# Patient Record
Sex: MALE | Race: BLACK OR AFRICAN AMERICAN | NOT HISPANIC OR LATINO | Employment: OTHER | URBAN - METROPOLITAN AREA
[De-identification: names, ages, dates, MRNs, and addresses within clinical notes are randomized per-mention and may not be internally consistent; named-entity substitution may affect disease eponyms.]

---

## 2019-06-02 ENCOUNTER — HOSPITAL ENCOUNTER (INPATIENT)
Facility: HOSPITAL | Age: 84
LOS: 4 days | Discharge: HOME HEALTH | DRG: 871 | End: 2019-06-06
Attending: FAMILY MEDICINE | Admitting: HOSPITALIST
Payer: MEDICARE

## 2019-06-02 DIAGNOSIS — R78.81 BACTEREMIA: ICD-10-CM

## 2019-06-02 DIAGNOSIS — A41.9 SEPSIS: Primary | ICD-10-CM

## 2019-06-02 DIAGNOSIS — A49.1 STREPTOCOCCUS AGALACTIAE INFECTION: ICD-10-CM

## 2019-06-02 DIAGNOSIS — J18.9 PNEUMONIA OF RIGHT MIDDLE LOBE DUE TO INFECTIOUS ORGANISM: ICD-10-CM

## 2019-06-02 PROBLEM — N17.9 ACUTE KIDNEY INJURY SUPERIMPOSED ON CHRONIC KIDNEY DISEASE: Status: ACTIVE | Noted: 2019-06-02

## 2019-06-02 PROBLEM — N18.9 ACUTE KIDNEY INJURY SUPERIMPOSED ON CHRONIC KIDNEY DISEASE: Status: ACTIVE | Noted: 2019-06-02

## 2019-06-02 LAB
ALBUMIN SERPL BCP-MCNC: 3.7 G/DL (ref 3.5–5.2)
ALP SERPL-CCNC: 73 U/L (ref 55–135)
ALT SERPL W/O P-5'-P-CCNC: 25 U/L (ref 10–44)
ANION GAP SERPL CALC-SCNC: 11 MMOL/L (ref 8–16)
APTT BLDCRRT: 26.1 SEC (ref 21–32)
AST SERPL-CCNC: 18 U/L (ref 10–40)
BASOPHILS # BLD AUTO: 0 K/UL (ref 0–0.2)
BASOPHILS NFR BLD: 0 % (ref 0–1.9)
BILIRUB SERPL-MCNC: 0.6 MG/DL (ref 0.1–1)
BILIRUB UR QL STRIP: NEGATIVE
BNP SERPL-MCNC: 73 PG/ML (ref 0–99)
BUN SERPL-MCNC: 30 MG/DL (ref 8–23)
CALCIUM SERPL-MCNC: 9.7 MG/DL (ref 8.7–10.5)
CHLORIDE SERPL-SCNC: 101 MMOL/L (ref 95–110)
CLARITY UR: CLEAR
CO2 SERPL-SCNC: 26 MMOL/L (ref 23–29)
COLOR UR: YELLOW
CREAT SERPL-MCNC: 1.9 MG/DL (ref 0.5–1.4)
DIFFERENTIAL METHOD: ABNORMAL
EOSINOPHIL # BLD AUTO: 0.1 K/UL (ref 0–0.5)
EOSINOPHIL NFR BLD: 0.4 % (ref 0–8)
ERYTHROCYTE [DISTWIDTH] IN BLOOD BY AUTOMATED COUNT: 15.1 % (ref 11.5–14.5)
EST. GFR  (AFRICAN AMERICAN): 36 ML/MIN/1.73 M^2
EST. GFR  (NON AFRICAN AMERICAN): 31 ML/MIN/1.73 M^2
GLUCOSE SERPL-MCNC: 140 MG/DL (ref 70–110)
GLUCOSE UR QL STRIP: NEGATIVE
HCT VFR BLD AUTO: 39.2 % (ref 40–54)
HGB BLD-MCNC: 12.8 G/DL (ref 14–18)
HGB UR QL STRIP: ABNORMAL
INFLUENZA A, MOLECULAR: NEGATIVE
INFLUENZA B, MOLECULAR: NEGATIVE
INR PPP: 1 (ref 0.8–1.2)
KETONES UR QL STRIP: NEGATIVE
LACTATE SERPL-SCNC: 2.1 MMOL/L (ref 0.5–2.2)
LEUKOCYTE ESTERASE UR QL STRIP: NEGATIVE
LIPASE SERPL-CCNC: 11 U/L (ref 4–60)
LYMPHOCYTES # BLD AUTO: 0.2 K/UL (ref 1–4.8)
LYMPHOCYTES NFR BLD: 1.4 % (ref 18–48)
MAGNESIUM SERPL-MCNC: 1.8 MG/DL (ref 1.6–2.6)
MCH RBC QN AUTO: 30.8 PG (ref 27–31)
MCHC RBC AUTO-ENTMCNC: 32.7 G/DL (ref 32–36)
MCV RBC AUTO: 95 FL (ref 82–98)
MONOCYTES # BLD AUTO: 0.6 K/UL (ref 0.3–1)
MONOCYTES NFR BLD: 4.7 % (ref 4–15)
NEUTROPHILS # BLD AUTO: 12.9 K/UL (ref 1.8–7.7)
NEUTROPHILS NFR BLD: 93.5 % (ref 38–73)
NITRITE UR QL STRIP: NEGATIVE
PH UR STRIP: 6 [PH] (ref 5–8)
PHOSPHATE SERPL-MCNC: 2.4 MG/DL (ref 2.7–4.5)
PLATELET # BLD AUTO: 128 K/UL (ref 150–350)
PMV BLD AUTO: 12.2 FL (ref 9.2–12.9)
POTASSIUM SERPL-SCNC: 4.6 MMOL/L (ref 3.5–5.1)
PROCALCITONIN SERPL IA-MCNC: 0.22 NG/ML
PROT SERPL-MCNC: 7 G/DL (ref 6–8.4)
PROT UR QL STRIP: NEGATIVE
PROTHROMBIN TIME: 10.4 SEC (ref 9–12.5)
RBC # BLD AUTO: 4.15 M/UL (ref 4.6–6.2)
SODIUM SERPL-SCNC: 138 MMOL/L (ref 136–145)
SP GR UR STRIP: 1.01 (ref 1–1.03)
SPECIMEN SOURCE: NORMAL
TROPONIN I SERPL DL<=0.01 NG/ML-MCNC: 0.02 NG/ML (ref 0–0.03)
TSH SERPL DL<=0.005 MIU/L-ACNC: 0.65 UIU/ML (ref 0.4–4)
URN SPEC COLLECT METH UR: ABNORMAL
UROBILINOGEN UR STRIP-ACNC: NEGATIVE EU/DL
WBC # BLD AUTO: 13.74 K/UL (ref 3.9–12.7)

## 2019-06-02 PROCEDURE — 87040 BLOOD CULTURE FOR BACTERIA: CPT | Mod: 59

## 2019-06-02 PROCEDURE — 83690 ASSAY OF LIPASE: CPT

## 2019-06-02 PROCEDURE — 87147 CULTURE TYPE IMMUNOLOGIC: CPT | Mod: 59

## 2019-06-02 PROCEDURE — 96361 HYDRATE IV INFUSION ADD-ON: CPT | Mod: 59

## 2019-06-02 PROCEDURE — 96375 TX/PRO/DX INJ NEW DRUG ADDON: CPT | Performed by: FAMILY MEDICINE

## 2019-06-02 PROCEDURE — 87449 NOS EACH ORGANISM AG IA: CPT

## 2019-06-02 PROCEDURE — 83605 ASSAY OF LACTIC ACID: CPT

## 2019-06-02 PROCEDURE — 96361 HYDRATE IV INFUSION ADD-ON: CPT | Performed by: FAMILY MEDICINE

## 2019-06-02 PROCEDURE — 11000001 HC ACUTE MED/SURG PRIVATE ROOM

## 2019-06-02 PROCEDURE — 87186 SC STD MICRODIL/AGAR DIL: CPT

## 2019-06-02 PROCEDURE — 96374 THER/PROPH/DIAG INJ IV PUSH: CPT

## 2019-06-02 PROCEDURE — 93010 EKG 12-LEAD: ICD-10-PCS | Mod: ,,, | Performed by: INTERNAL MEDICINE

## 2019-06-02 PROCEDURE — 63600175 PHARM REV CODE 636 W HCPCS: Performed by: HOSPITALIST

## 2019-06-02 PROCEDURE — 96374 THER/PROPH/DIAG INJ IV PUSH: CPT | Mod: 59 | Performed by: FAMILY MEDICINE

## 2019-06-02 PROCEDURE — 99291 CRITICAL CARE FIRST HOUR: CPT | Mod: 25

## 2019-06-02 PROCEDURE — 25000242 PHARM REV CODE 250 ALT 637 W/ HCPCS: Performed by: FAMILY MEDICINE

## 2019-06-02 PROCEDURE — 83880 ASSAY OF NATRIURETIC PEPTIDE: CPT

## 2019-06-02 PROCEDURE — 94640 AIRWAY INHALATION TREATMENT: CPT

## 2019-06-02 PROCEDURE — 81003 URINALYSIS AUTO W/O SCOPE: CPT

## 2019-06-02 PROCEDURE — 85025 COMPLETE CBC W/AUTO DIFF WBC: CPT

## 2019-06-02 PROCEDURE — 87502 INFLUENZA DNA AMP PROBE: CPT

## 2019-06-02 PROCEDURE — 84100 ASSAY OF PHOSPHORUS: CPT

## 2019-06-02 PROCEDURE — 63600175 PHARM REV CODE 636 W HCPCS: Performed by: FAMILY MEDICINE

## 2019-06-02 PROCEDURE — 85610 PROTHROMBIN TIME: CPT

## 2019-06-02 PROCEDURE — 85730 THROMBOPLASTIN TIME PARTIAL: CPT

## 2019-06-02 PROCEDURE — 83735 ASSAY OF MAGNESIUM: CPT

## 2019-06-02 PROCEDURE — G0378 HOSPITAL OBSERVATION PER HR: HCPCS

## 2019-06-02 PROCEDURE — 25000003 PHARM REV CODE 250: Performed by: HOSPITALIST

## 2019-06-02 PROCEDURE — S0028 INJECTION, FAMOTIDINE, 20 MG: HCPCS | Performed by: HOSPITALIST

## 2019-06-02 PROCEDURE — 93010 ELECTROCARDIOGRAM REPORT: CPT | Mod: ,,, | Performed by: INTERNAL MEDICINE

## 2019-06-02 PROCEDURE — 84443 ASSAY THYROID STIM HORMONE: CPT

## 2019-06-02 PROCEDURE — 84145 PROCALCITONIN (PCT): CPT

## 2019-06-02 PROCEDURE — 25000003 PHARM REV CODE 250: Performed by: FAMILY MEDICINE

## 2019-06-02 PROCEDURE — 93005 ELECTROCARDIOGRAM TRACING: CPT

## 2019-06-02 PROCEDURE — 84484 ASSAY OF TROPONIN QUANT: CPT

## 2019-06-02 PROCEDURE — 80053 COMPREHEN METABOLIC PANEL: CPT

## 2019-06-02 RX ORDER — FINASTERIDE 5 MG/1
5 TABLET, FILM COATED ORAL DAILY
Status: DISCONTINUED | OUTPATIENT
Start: 2019-06-03 | End: 2019-06-06 | Stop reason: HOSPADM

## 2019-06-02 RX ORDER — ARFORMOTEROL TARTRATE 15 UG/2ML
15 SOLUTION RESPIRATORY (INHALATION) EVERY 12 HOURS
Status: DISCONTINUED | OUTPATIENT
Start: 2019-06-03 | End: 2019-06-06 | Stop reason: HOSPADM

## 2019-06-02 RX ORDER — ASPIRIN 81 MG/1
81 TABLET ORAL DAILY
Status: DISCONTINUED | OUTPATIENT
Start: 2019-06-03 | End: 2019-06-06 | Stop reason: HOSPADM

## 2019-06-02 RX ORDER — METOPROLOL SUCCINATE 25 MG/1
25 TABLET, EXTENDED RELEASE ORAL DAILY
COMMUNITY

## 2019-06-02 RX ORDER — ACETAMINOPHEN 325 MG/1
650 TABLET ORAL
Status: COMPLETED | OUTPATIENT
Start: 2019-06-02 | End: 2019-06-02

## 2019-06-02 RX ORDER — IPRATROPIUM BROMIDE AND ALBUTEROL SULFATE 2.5; .5 MG/3ML; MG/3ML
3 SOLUTION RESPIRATORY (INHALATION) EVERY 6 HOURS PRN
Status: DISCONTINUED | OUTPATIENT
Start: 2019-06-02 | End: 2019-06-06 | Stop reason: HOSPADM

## 2019-06-02 RX ORDER — METOPROLOL SUCCINATE 25 MG/1
25 TABLET, EXTENDED RELEASE ORAL DAILY
Status: DISCONTINUED | OUTPATIENT
Start: 2019-06-03 | End: 2019-06-06 | Stop reason: HOSPADM

## 2019-06-02 RX ORDER — FINASTERIDE 5 MG/1
5 TABLET, FILM COATED ORAL DAILY
COMMUNITY

## 2019-06-02 RX ORDER — SODIUM CHLORIDE 0.9 % (FLUSH) 0.9 %
10 SYRINGE (ML) INJECTION
Status: DISCONTINUED | OUTPATIENT
Start: 2019-06-02 | End: 2019-06-06 | Stop reason: HOSPADM

## 2019-06-02 RX ORDER — IPRATROPIUM BROMIDE AND ALBUTEROL SULFATE 2.5; .5 MG/3ML; MG/3ML
3 SOLUTION RESPIRATORY (INHALATION)
Status: COMPLETED | OUTPATIENT
Start: 2019-06-02 | End: 2019-06-02

## 2019-06-02 RX ORDER — BUDESONIDE 0.5 MG/2ML
0.5 INHALANT ORAL EVERY 12 HOURS
Status: DISCONTINUED | OUTPATIENT
Start: 2019-06-03 | End: 2019-06-06 | Stop reason: HOSPADM

## 2019-06-02 RX ORDER — ACETAMINOPHEN 325 MG/1
325 TABLET ORAL EVERY 4 HOURS PRN
Status: DISCONTINUED | OUTPATIENT
Start: 2019-06-02 | End: 2019-06-06 | Stop reason: HOSPADM

## 2019-06-02 RX ORDER — ALBUTEROL SULFATE 90 UG/1
2 AEROSOL, METERED RESPIRATORY (INHALATION) EVERY 6 HOURS PRN
COMMUNITY

## 2019-06-02 RX ORDER — LOSARTAN POTASSIUM 50 MG/1
50 TABLET ORAL DAILY
COMMUNITY

## 2019-06-02 RX ORDER — ONDANSETRON 2 MG/ML
4 INJECTION INTRAMUSCULAR; INTRAVENOUS EVERY 8 HOURS PRN
Status: DISCONTINUED | OUTPATIENT
Start: 2019-06-02 | End: 2019-06-06 | Stop reason: HOSPADM

## 2019-06-02 RX ORDER — BENZONATATE 100 MG/1
100 CAPSULE ORAL 3 TIMES DAILY PRN
Status: DISCONTINUED | OUTPATIENT
Start: 2019-06-02 | End: 2019-06-06 | Stop reason: HOSPADM

## 2019-06-02 RX ORDER — SODIUM CHLORIDE 450 MG/100ML
INJECTION, SOLUTION INTRAVENOUS CONTINUOUS
Status: ACTIVE | OUTPATIENT
Start: 2019-06-02 | End: 2019-06-03

## 2019-06-02 RX ORDER — ATORVASTATIN CALCIUM 40 MG/1
40 TABLET, FILM COATED ORAL NIGHTLY
Status: DISCONTINUED | OUTPATIENT
Start: 2019-06-02 | End: 2019-06-02

## 2019-06-02 RX ORDER — FAMOTIDINE 20 MG/50ML
20 INJECTION, SOLUTION INTRAVENOUS DAILY
Status: DISCONTINUED | OUTPATIENT
Start: 2019-06-02 | End: 2019-06-06 | Stop reason: HOSPADM

## 2019-06-02 RX ORDER — ATORVASTATIN CALCIUM 10 MG/1
10 TABLET, FILM COATED ORAL DAILY
Status: DISCONTINUED | OUTPATIENT
Start: 2019-06-03 | End: 2019-06-06 | Stop reason: HOSPADM

## 2019-06-02 RX ORDER — CEFEPIME HYDROCHLORIDE 2 G/50ML
2 INJECTION, SOLUTION INTRAVENOUS
Status: DISCONTINUED | OUTPATIENT
Start: 2019-06-02 | End: 2019-06-02

## 2019-06-02 RX ORDER — METHYLPREDNISOLONE SOD SUCC 125 MG
125 VIAL (EA) INJECTION
Status: COMPLETED | OUTPATIENT
Start: 2019-06-02 | End: 2019-06-02

## 2019-06-02 RX ORDER — CARVEDILOL 3.12 MG/1
6.25 TABLET ORAL 2 TIMES DAILY WITH MEALS
Status: DISCONTINUED | OUTPATIENT
Start: 2019-06-02 | End: 2019-06-02

## 2019-06-02 RX ORDER — FUROSEMIDE 20 MG/1
20 TABLET ORAL DAILY
COMMUNITY

## 2019-06-02 RX ORDER — ATORVASTATIN CALCIUM 10 MG/1
10 TABLET, FILM COATED ORAL DAILY
COMMUNITY

## 2019-06-02 RX ADMIN — METHYLPREDNISOLONE SODIUM SUCCINATE 125 MG: 125 INJECTION, POWDER, FOR SOLUTION INTRAMUSCULAR; INTRAVENOUS at 06:06

## 2019-06-02 RX ADMIN — IPRATROPIUM BROMIDE AND ALBUTEROL SULFATE 3 ML: .5; 3 SOLUTION RESPIRATORY (INHALATION) at 06:06

## 2019-06-02 RX ADMIN — Medication 500 MG: at 08:06

## 2019-06-02 RX ADMIN — CEFTRIAXONE 1 G: 1 INJECTION, SOLUTION INTRAVENOUS at 11:06

## 2019-06-02 RX ADMIN — ACETAMINOPHEN 650 MG: 325 TABLET ORAL at 05:06

## 2019-06-02 RX ADMIN — FAMOTIDINE 20 MG: 20 INJECTION, SOLUTION INTRAVENOUS at 10:06

## 2019-06-02 RX ADMIN — SODIUM CHLORIDE 3063 ML: 0.9 INJECTION, SOLUTION INTRAVENOUS at 05:06

## 2019-06-02 RX ADMIN — PROMETHAZINE HYDROCHLORIDE 12.5 MG: 25 INJECTION INTRAMUSCULAR; INTRAVENOUS at 06:06

## 2019-06-02 RX ADMIN — SODIUM CHLORIDE: 0.45 INJECTION, SOLUTION INTRAVENOUS at 08:06

## 2019-06-02 NOTE — ED NOTES
Pt c/o SOB while resting and on exertion, fever, nausea, tiredness, urinary frequency - onset today. Denies chest pain, vomiting, dysuria.    Patient moved to ED room 1. Patient assisted onto stretcher and changed into a gown. Patient placed on cardiac monitor, continuous pulse oximetry and automatic blood pressure cuff. Bed placed in low locked position, side rails up x 2, call light is within reach of patient or family, orientation to room and explanation of wait provided to family and patient, alarms set and turned on for monitor and pulse ox, awaiting MD evaluation and orders, will continue to monitor.    Patient identifies self as Thai Rincon      LOC: The patient is awake, alert and aware of environment with an appropriate affect, the patient is oriented x 3 and speaking appropriately.  APPEARANCE: Patient resting comfortably and in no acute distress, patient is clean and well groomed, patient's clothing is properly fastened.  SKIN: The skin is warm and dry, color consistent with ethnicity, patient has normal skin turgor and moist mucus membranes, skin intact, no breakdown or bruising noted.  MUSCULOSKELETAL: Patient moving all extremities well, no obvious swelling. First and second digit of left hand are not present.   RESPIRATORY: Airway is open and patent, respirations are spontaneous, patient has a normal effort and rate, no accessory muscle use noted.  CARDIAC: Patient has a normal rate and rhythm, no periphreal edema noted, capillary refill < 3 seconds.  ABDOMEN: Soft and non tender to palpation, no distention noted.  NEUROLOGIC: PERRL, eyes open spontaneously, behavior appropriate to situation, follows commands, facial expression symmetrical, bilateral hand grasp equal and even, purposeful motor response noted, normal sensation in all extremities when touched with a finger.

## 2019-06-02 NOTE — ED NOTES
The patient is resting quietly, eyes closed, arouses easily to stimuli. Airway is open and patent, respirations are spontaneous, normal respiratory effort and rate noted, skin warm and dry, appearance: in no acute distress and resting comfortably. Wife at bedside.

## 2019-06-02 NOTE — ED PROVIDER NOTES
SCRIBE #1 NOTE: I, Judithjackie Slater, am scribing for, and in the presence of, Liz Gee MD. I have scribed the entire note.         History     Chief Complaint   Patient presents with    Fever     pt c/o fever and malaise       Review of patient's allergies indicates:  No Known Allergies      History of Present Illness   HPI    6/2/2019, 4:39 PM  History obtained from the daughter and patient      History of Present Illness: Thai Rincon is a 88 y.o. male patient with PMHx of BPH and HTN who presents to the Emergency Department for SOB which onset gradually at 1:30pm. Symptoms are constant and moderate in severity. No mitigating or exacerbating factors reported. Associated sxs include fatigue. Daughter notes patient has been having nausea, fever, chills, and urinary frequency since the drive back from [place]. Patient reports having a normal BM today. Patient/daughter denies any cough, CP, leg swelling, abd pain, dysuria, vomiting, diarrhea, sick contacts, and all other sxs at this time. Patient is not on supplement O2 at home. No further complaints or concerns at this time.     Arrival mode: Personal vehicle     PCP: Enoc Frost MD        Past Medical History:  Past Medical History:   Diagnosis Date    BPH (benign prostatic hyperplasia)     Glaucoma     Hypertension     Kidney stones        Past Surgical History:  Past Surgical History:   Procedure Laterality Date    HERNIA REPAIR Right 2010         Family History:  History reviewed. No pertinent family history.    Social History:  Social History     Tobacco Use    Smoking status: Never Smoker    Smokeless tobacco: Never Used   Substance and Sexual Activity    Alcohol use: No     Alcohol/week: 0.0 oz    Drug use: No    Sexual activity: Not Currently        Review of Systems   Review of Systems   Constitutional: Positive for chills, fatigue and fever.   HENT: Negative for sore throat.    Respiratory: Positive for shortness of breath. Negative for cough.     Cardiovascular: Negative for chest pain and leg swelling.   Gastrointestinal: Positive for nausea. Negative for abdominal pain, diarrhea and vomiting.   Genitourinary: Positive for frequency. Negative for dysuria.   Musculoskeletal: Negative for back pain.   Skin: Negative for rash.   Neurological: Negative for weakness.   Hematological: Does not bruise/bleed easily.   All other systems reviewed and are negative.       Physical Exam     Initial Vitals [06/02/19 1620]   BP Pulse Resp Temp SpO2   139/81 94 (!) 24 (!) 100.9 °F (38.3 °C) 95 %      MAP       --          Physical Exam  Nursing Notes and Vital Signs Reviewed.  Constitutional: Patient is in no acute distress. Well-developed and well-nourished. Patient appears fatigued.  Head: Atraumatic. Normocephalic.  Eyes: PERRL. EOM intact. Conjunctivae are not pale. No scleral icterus.  ENT: Mucous membranes are moist. Oropharynx is clear and symmetric.    Neck: Supple. Full ROM. No lymphadenopathy.  Cardiovascular: Regular rate. Regular rhythm. No murmurs, rubs, or gallops. Distal pulses are 2+ and symmetric.  Pulmonary/Chest: Slightly tachypneic. Conversational dyspnea. R upper mid-lobe decreased breath sounds. Clear to auscultation bilaterally. No wheezing or rales.  Abdominal: Soft and non-distended.  There is no tenderness.  No rebound, guarding, or rigidity. Good bowel sounds.  Musculoskeletal: Moves all extremities. No obvious deformities. No edema.   Skin: Warm and dry.  Neurological:  Alert, awake, and appropriate.  Normal speech.  No acute focal neurological deficits are appreciated.  Psychiatric: Normal affect. Good eye contact. Appropriate in content.     ED Course   Critical Care  Date/Time: 6/2/2019 6:22 PM  Performed by: Liz Gee MD  Authorized by: Liz Gee MD   Direct patient critical care time: 10 minutes  Additional history critical care time: 9 minutes  Ordering / reviewing critical care time: 9 minutes  Documentation critical care  "time: 8 minutes  Consulting other physicians critical care time: 9 minutes  Total critical care time (exclusive of procedural time) : 45 minutes  Critical care time was exclusive of separately billable procedures and treating other patients and teaching time.  Critical care was necessary to treat or prevent imminent or life-threatening deterioration of the following conditions: sepsis.  Critical care was time spent personally by me on the following activities: blood draw for specimens, development of treatment plan with patient or surrogate, discussions with consultants, interpretation of cardiac output measurements, evaluation of patient's response to treatment, examination of patient, obtaining history from patient or surrogate, ordering and performing treatments and interventions, ordering and review of laboratory studies, ordering and review of radiographic studies, re-evaluation of patient's condition, pulse oximetry and review of old charts.        ED Vital Signs:  Vitals:    06/02/19 1620 06/02/19 1713 06/02/19 1830 06/02/19 1838   BP: 139/81  (!) 149/70    Pulse: 94 85 90 88   Resp: (!) 24  20 (!) 24   Temp: (!) 100.9 °F (38.3 °C)      TempSrc: Oral      SpO2: 95%  97% 96%   Weight: 102.1 kg (225 lb)      Height: 5' 9" (1.753 m)       06/02/19 1841 06/02/19 2049 06/02/19 2338   BP:  (!) 116/59 126/67   Pulse:  86 71   Resp:  16 20   Temp: (!) 100.4 °F (38 °C) 98.6 °F (37 °C) 97.4 °F (36.3 °C)   TempSrc: Oral Oral Oral   SpO2:  95% 95%   Weight:  93.6 kg (206 lb 5.6 oz)    Height:  5' 9" (1.753 m)        Abnormal Lab Results:  Labs Reviewed   CBC W/ AUTO DIFFERENTIAL - Abnormal; Notable for the following components:       Result Value    WBC 13.74 (*)     RBC 4.15 (*)     Hemoglobin 12.8 (*)     Hematocrit 39.2 (*)     RDW 15.1 (*)     Platelets 128 (*)     Gran # (ANC) 12.9 (*)     Lymph # 0.2 (*)     Gran% 93.5 (*)     Lymph% 1.4 (*)     All other components within normal limits   COMPREHENSIVE METABOLIC " PANEL - Abnormal; Notable for the following components:    Glucose 140 (*)     BUN, Bld 30 (*)     Creatinine 1.9 (*)     eGFR if  36 (*)     eGFR if non  31 (*)     All other components within normal limits   URINALYSIS, REFLEX TO URINE CULTURE - Abnormal; Notable for the following components:    Occult Blood UA Trace (*)     All other components within normal limits    Narrative:     Preferred Collection Type->Urine, Clean Catch   PHOSPHORUS - Abnormal; Notable for the following components:    Phosphorus 2.4 (*)     All other components within normal limits   INFLUENZA A & B BY MOLECULAR   CULTURE, BLOOD   CULTURE, BLOOD   LACTIC ACID, PLASMA   MAGNESIUM   APTT   PROTIME-INR   B-TYPE NATRIURETIC PEPTIDE   LIPASE   PROCALCITONIN   TROPONIN I   TSH        All Lab Results:  Results for orders placed or performed during the hospital encounter of 06/02/19   Influenza A & B by Molecular   Result Value Ref Range    Influenza A, Molecular Negative Negative    Influenza B, Molecular Negative Negative    Flu A & B Source Nasal swab    CBC auto differential   Result Value Ref Range    WBC 13.74 (H) 3.90 - 12.70 K/uL    RBC 4.15 (L) 4.60 - 6.20 M/uL    Hemoglobin 12.8 (L) 14.0 - 18.0 g/dL    Hematocrit 39.2 (L) 40.0 - 54.0 %    Mean Corpuscular Volume 95 82 - 98 fL    Mean Corpuscular Hemoglobin 30.8 27.0 - 31.0 pg    Mean Corpuscular Hemoglobin Conc 32.7 32.0 - 36.0 g/dL    RDW 15.1 (H) 11.5 - 14.5 %    Platelets 128 (L) 150 - 350 K/uL    MPV 12.2 9.2 - 12.9 fL    Gran # (ANC) 12.9 (H) 1.8 - 7.7 K/uL    Lymph # 0.2 (L) 1.0 - 4.8 K/uL    Mono # 0.6 0.3 - 1.0 K/uL    Eos # 0.1 0.0 - 0.5 K/uL    Baso # 0.00 0.00 - 0.20 K/uL    Gran% 93.5 (H) 38.0 - 73.0 %    Lymph% 1.4 (L) 18.0 - 48.0 %    Mono% 4.7 4.0 - 15.0 %    Eosinophil% 0.4 0.0 - 8.0 %    Basophil% 0.0 0.0 - 1.9 %    Differential Method Automated    Comprehensive metabolic panel   Result Value Ref Range    Sodium 138 136 - 145 mmol/L     Potassium 4.6 3.5 - 5.1 mmol/L    Chloride 101 95 - 110 mmol/L    CO2 26 23 - 29 mmol/L    Glucose 140 (H) 70 - 110 mg/dL    BUN, Bld 30 (H) 8 - 23 mg/dL    Creatinine 1.9 (H) 0.5 - 1.4 mg/dL    Calcium 9.7 8.7 - 10.5 mg/dL    Total Protein 7.0 6.0 - 8.4 g/dL    Albumin 3.7 3.5 - 5.2 g/dL    Total Bilirubin 0.6 0.1 - 1.0 mg/dL    Alkaline Phosphatase 73 55 - 135 U/L    AST 18 10 - 40 U/L    ALT 25 10 - 44 U/L    Anion Gap 11 8 - 16 mmol/L    eGFR if African American 36 (A) >60 mL/min/1.73 m^2    eGFR if non African American 31 (A) >60 mL/min/1.73 m^2   Lactic acid, plasma #1   Result Value Ref Range    Lactate (Lactic Acid) 2.1 0.5 - 2.2 mmol/L   Urinalysis, Reflex to Urine Culture Urine, Clean Catch   Result Value Ref Range    Specimen UA Urine, Clean Catch     Color, UA Yellow Yellow, Straw, Lilia    Appearance, UA Clear Clear    pH, UA 6.0 5.0 - 8.0    Specific Gravity, UA 1.010 1.005 - 1.030    Protein, UA Negative Negative    Glucose, UA Negative Negative    Ketones, UA Negative Negative    Bilirubin (UA) Negative Negative    Occult Blood UA Trace (A) Negative    Nitrite, UA Negative Negative    Urobilinogen, UA Negative <2.0 EU/dL    Leukocytes, UA Negative Negative   Magnesium   Result Value Ref Range    Magnesium 1.8 1.6 - 2.6 mg/dL   Phosphorus   Result Value Ref Range    Phosphorus 2.4 (L) 2.7 - 4.5 mg/dL   APTT   Result Value Ref Range    aPTT 26.1 21.0 - 32.0 sec   Protime-INR   Result Value Ref Range    Prothrombin Time 10.4 9.0 - 12.5 sec    INR 1.0 0.8 - 1.2   Brain natriuretic peptide   Result Value Ref Range    BNP 73 0 - 99 pg/mL   Lipase   Result Value Ref Range    Lipase 11 4 - 60 U/L   Procalcitonin   Result Value Ref Range    Procalcitonin 0.22 <0.25 ng/mL   Troponin I   Result Value Ref Range    Troponin I 0.019 0.000 - 0.026 ng/mL   TSH   Result Value Ref Range    TSH 0.652 0.400 - 4.000 uIU/mL       Imaging Results:  Imaging Results          X-Ray Chest AP Portable (Final result)  Result  time 06/02/19 17:13:29    Final result by Dorian Gonzalez MD (06/02/19 17:13:29)                 Impression:      Discoid atelectasis right mid lung.  Otherwise no acute chest findings.      Electronically signed by: Dorian Gonzalez  Date:    06/02/2019  Time:    17:13             Narrative:    EXAMINATION:  XR CHEST AP PORTABLE    CLINICAL HISTORY:  Sepsis;    COMPARISON:  10/18/2006    FINDINGS:  Discoid atelectasis identified right mid lung.  Left lung clear.  Heart size within normal limits.No significant bony findings.                                 The EKG was ordered, reviewed, and independently interpreted by the ED provider.  Interpretation time: 1659  Rate: 88 BPM  Rhythm: normal sinus rhythm  Interpretation: Normal ECG. No acute ST abnormality. No STEMI.          The Emergency Provider reviewed the vital signs and test results, which are outlined above.     ED Discussion     6:09 PM: Re-evaluated pt. Pt is resting comfortably and is in no acute distress. Recommended admission for potential pneumonia. Patient states he does not want to be admitted, but daughter wants patient to be admitted. Daughter will speak to patient and come to a decision.  D/w pt all pertinent results. D/w pt any concerns expressed at this time. Answered all questions. Pt expresses understanding at this time.    30mL/kg IVF have been administered within 3 hours of identification of SIRS criteria. Vital signs were reviewed and a focal sepsis perfusion assessment was performed.      6:22 PM: Re-evaluated pt. Patient agrees to admission. I have discussed test results, shared treatment plan, and the need for admission with patient and family at bedside. Pt and family express understanding at this time and agree with all information. All questions answered. Pt and family have no further questions or concerns at this time. Pt is ready for admit.    6:27 PM: Discussed case with Hattie Torrez NP (Hospital Medicine). Dr. Armas  agrees with current care and management of pt and accepts admission.   Admitting Service: Hospital medicine   Admitting Physician: Dr. Armas  Admit to: Obs          ED Medication(s):  Medications   sodium chloride 0.9% flush 10 mL (has no administration in time range)   0.45% NaCl infusion ( Intravenous New Bag 6/2/19 2050)   cefTRIAXone (ROCEPHIN) 1 g in dextrose 5 % 50 mL IVPB (1 g Intravenous New Bag 6/2/19 2300)   azithromycin 500 mg in dextrose 5 % 250 mL IVPB (ready to mix system) (500 mg Intravenous New Bag 6/2/19 2050)   benzonatate capsule 100 mg (has no administration in time range)   albuterol-ipratropium 2.5 mg-0.5 mg/3 mL nebulizer solution 3 mL (has no administration in time range)   arformoterol nebulizer solution 15 mcg (has no administration in time range)   budesonide nebulizer solution 0.5 mg (has no administration in time range)   ondansetron injection 4 mg (has no administration in time range)   promethazine (PHENERGAN) 6.25 mg in dextrose 5 % 50 mL IVPB (has no administration in time range)   acetaminophen tablet 325 mg (has no administration in time range)   aspirin EC tablet 81 mg (has no administration in time range)   famotidine IVPB 20 mg (20 mg Intravenous New Bag 6/2/19 2218)   atorvastatin tablet 10 mg (has no administration in time range)   finasteride tablet 5 mg (has no administration in time range)   metoprolol succinate (TOPROL-XL) 24 hr tablet 25 mg (has no administration in time range)   sodium chloride 0.9% bolus 3,063 mL (0 mL/kg × 102.1 kg Intravenous Stopped 6/2/19 1924)   acetaminophen tablet 650 mg (650 mg Oral Given 6/2/19 1709)   promethazine (PHENERGAN) 12.5 mg in dextrose 5 % 50 mL IVPB (0 mg Intravenous Stopped 6/2/19 1823)   methylPREDNISolone sodium succinate injection 125 mg (125 mg Intravenous Given by Other 6/2/19 1816)   albuterol-ipratropium 2.5 mg-0.5 mg/3 mL nebulizer solution 3 mL (3 mLs Nebulization Given 6/2/19 1838)     Current Discharge Medication List                    Medical Decision Making     Medical Decision Making:   Clinical Tests:   Lab Tests: Ordered and Reviewed  Radiological Study: Ordered and Reviewed  Medical Tests: Ordered and Reviewed             Scribe Attestation:   Scribe #1: I performed the above scribed service and the documentation accurately describes the services I performed. I attest to the accuracy of the note.     Attending:   Physician Attestation Statement for Scribe #1: I, Liz Gee MD, personally performed the services described in this documentation, as scribed by Judith Slater, in my presence, and it is both accurate and complete.           Clinical Impression       ICD-10-CM ICD-9-CM   1. Sepsis A41.9 038.9     995.91       Disposition:   Disposition: Placed in Observation  Condition: Fair         Liz Gee MD  06/03/19 6229

## 2019-06-03 PROBLEM — I10 BENIGN ESSENTIAL HTN: Status: ACTIVE | Noted: 2019-06-03

## 2019-06-03 PROBLEM — D63.8 ANEMIA OF CHRONIC ILLNESS: Status: ACTIVE | Noted: 2019-06-03

## 2019-06-03 PROBLEM — R78.81 GRAM-POSITIVE COCCI BACTEREMIA: Status: ACTIVE | Noted: 2019-06-03

## 2019-06-03 PROBLEM — A41.9 SEPSIS: Status: ACTIVE | Noted: 2019-06-03

## 2019-06-03 LAB
ALBUMIN SERPL BCP-MCNC: 3 G/DL (ref 3.5–5.2)
ANION GAP SERPL CALC-SCNC: 11 MMOL/L (ref 8–16)
ANION GAP SERPL CALC-SCNC: 6 MMOL/L (ref 8–16)
ANISOCYTOSIS BLD QL SMEAR: SLIGHT
ANISOCYTOSIS BLD QL SMEAR: SLIGHT
BASOPHILS # BLD AUTO: 0.01 K/UL (ref 0–0.2)
BASOPHILS NFR BLD: 0 % (ref 0–1.9)
BASOPHILS NFR BLD: 0 % (ref 0–1.9)
BUN SERPL-MCNC: 25 MG/DL (ref 8–23)
BUN SERPL-MCNC: 29 MG/DL (ref 8–23)
BURR CELLS BLD QL SMEAR: ABNORMAL
BURR CELLS BLD QL SMEAR: ABNORMAL
CALCIUM SERPL-MCNC: 8.6 MG/DL (ref 8.7–10.5)
CALCIUM SERPL-MCNC: 8.9 MG/DL (ref 8.7–10.5)
CHLORIDE SERPL-SCNC: 105 MMOL/L (ref 95–110)
CHLORIDE SERPL-SCNC: 109 MMOL/L (ref 95–110)
CO2 SERPL-SCNC: 17 MMOL/L (ref 23–29)
CO2 SERPL-SCNC: 26 MMOL/L (ref 23–29)
CREAT SERPL-MCNC: 1.6 MG/DL (ref 0.5–1.4)
CREAT SERPL-MCNC: 1.6 MG/DL (ref 0.5–1.4)
DACRYOCYTES BLD QL SMEAR: ABNORMAL
DIFFERENTIAL METHOD: ABNORMAL
DIFFERENTIAL METHOD: ABNORMAL
EOSINOPHIL # BLD AUTO: 0 K/UL (ref 0–0.5)
EOSINOPHIL NFR BLD: 0 % (ref 0–8)
EOSINOPHIL NFR BLD: 0 % (ref 0–8)
ERYTHROCYTE [DISTWIDTH] IN BLOOD BY AUTOMATED COUNT: 15.2 % (ref 11.5–14.5)
ERYTHROCYTE [DISTWIDTH] IN BLOOD BY AUTOMATED COUNT: 15.5 % (ref 11.5–14.5)
EST. GFR  (AFRICAN AMERICAN): 44 ML/MIN/1.73 M^2
EST. GFR  (AFRICAN AMERICAN): 44 ML/MIN/1.73 M^2
EST. GFR  (NON AFRICAN AMERICAN): 38 ML/MIN/1.73 M^2
EST. GFR  (NON AFRICAN AMERICAN): 38 ML/MIN/1.73 M^2
GLUCOSE SERPL-MCNC: 118 MG/DL (ref 70–110)
GLUCOSE SERPL-MCNC: 131 MG/DL (ref 70–110)
HCT VFR BLD AUTO: 35.3 % (ref 40–54)
HCT VFR BLD AUTO: 36.9 % (ref 40–54)
HGB BLD-MCNC: 11.6 G/DL (ref 14–18)
HGB BLD-MCNC: 11.9 G/DL (ref 14–18)
HYPOCHROMIA BLD QL SMEAR: ABNORMAL
HYPOCHROMIA BLD QL SMEAR: ABNORMAL
LYMPHOCYTES # BLD AUTO: 0.5 K/UL (ref 1–4.8)
LYMPHOCYTES NFR BLD: 2 % (ref 18–48)
LYMPHOCYTES NFR BLD: 2.6 % (ref 18–48)
MAGNESIUM SERPL-MCNC: 1.6 MG/DL (ref 1.6–2.6)
MCH RBC QN AUTO: 30.3 PG (ref 27–31)
MCH RBC QN AUTO: 30.8 PG (ref 27–31)
MCHC RBC AUTO-ENTMCNC: 32.2 G/DL (ref 32–36)
MCHC RBC AUTO-ENTMCNC: 32.9 G/DL (ref 32–36)
MCV RBC AUTO: 92 FL (ref 82–98)
MCV RBC AUTO: 96 FL (ref 82–98)
MONOCYTES # BLD AUTO: 0.8 K/UL (ref 0.3–1)
MONOCYTES NFR BLD: 11 % (ref 4–15)
MONOCYTES NFR BLD: 3.8 % (ref 4–15)
NEUTROPHILS # BLD AUTO: 19.1 K/UL (ref 1.8–7.7)
NEUTROPHILS NFR BLD: 87 % (ref 38–73)
NEUTROPHILS NFR BLD: 93.6 % (ref 38–73)
OVALOCYTES BLD QL SMEAR: ABNORMAL
PHOSPHATE SERPL-MCNC: 2.6 MG/DL (ref 2.7–4.5)
PHOSPHATE SERPL-MCNC: 2.6 MG/DL (ref 2.7–4.5)
PLATELET # BLD AUTO: 106 K/UL (ref 150–350)
PLATELET # BLD AUTO: 113 K/UL (ref 150–350)
PLATELET BLD QL SMEAR: ABNORMAL
PMV BLD AUTO: 12.3 FL (ref 9.2–12.9)
PMV BLD AUTO: ABNORMAL FL (ref 9.2–12.9)
POIKILOCYTOSIS BLD QL SMEAR: SLIGHT
POIKILOCYTOSIS BLD QL SMEAR: SLIGHT
POLYCHROMASIA BLD QL SMEAR: ABNORMAL
POTASSIUM SERPL-SCNC: 4.5 MMOL/L (ref 3.5–5.1)
POTASSIUM SERPL-SCNC: 5.2 MMOL/L (ref 3.5–5.1)
RBC # BLD AUTO: 3.83 M/UL (ref 4.6–6.2)
RBC # BLD AUTO: 3.86 M/UL (ref 4.6–6.2)
SODIUM SERPL-SCNC: 137 MMOL/L (ref 136–145)
SODIUM SERPL-SCNC: 137 MMOL/L (ref 136–145)
SPHEROCYTES BLD QL SMEAR: ABNORMAL
SPHEROCYTES BLD QL SMEAR: ABNORMAL
STOMATOCYTES BLD QL SMEAR: PRESENT
STOMATOCYTES BLD QL SMEAR: PRESENT
TARGETS BLD QL SMEAR: ABNORMAL
WBC # BLD AUTO: 20.39 K/UL (ref 3.9–12.7)
WBC # BLD AUTO: 21.52 K/UL (ref 3.9–12.7)

## 2019-06-03 PROCEDURE — 36415 COLL VENOUS BLD VENIPUNCTURE: CPT

## 2019-06-03 PROCEDURE — 85027 COMPLETE CBC AUTOMATED: CPT

## 2019-06-03 PROCEDURE — 85025 COMPLETE CBC W/AUTO DIFF WBC: CPT

## 2019-06-03 PROCEDURE — 83735 ASSAY OF MAGNESIUM: CPT

## 2019-06-03 PROCEDURE — 80048 BASIC METABOLIC PNL TOTAL CA: CPT

## 2019-06-03 PROCEDURE — 94640 AIRWAY INHALATION TREATMENT: CPT

## 2019-06-03 PROCEDURE — 25000003 PHARM REV CODE 250: Performed by: NURSE PRACTITIONER

## 2019-06-03 PROCEDURE — 25000003 PHARM REV CODE 250: Performed by: HOSPITALIST

## 2019-06-03 PROCEDURE — 99900035 HC TECH TIME PER 15 MIN (STAT)

## 2019-06-03 PROCEDURE — 80069 RENAL FUNCTION PANEL: CPT

## 2019-06-03 PROCEDURE — 25000242 PHARM REV CODE 250 ALT 637 W/ HCPCS: Performed by: HOSPITALIST

## 2019-06-03 PROCEDURE — 85007 BL SMEAR W/DIFF WBC COUNT: CPT

## 2019-06-03 PROCEDURE — 96361 HYDRATE IV INFUSION ADD-ON: CPT | Performed by: FAMILY MEDICINE

## 2019-06-03 PROCEDURE — 94799 UNLISTED PULMONARY SVC/PX: CPT

## 2019-06-03 PROCEDURE — 11000001 HC ACUTE MED/SURG PRIVATE ROOM

## 2019-06-03 PROCEDURE — 25000003 PHARM REV CODE 250: Performed by: INTERNAL MEDICINE

## 2019-06-03 PROCEDURE — 63600175 PHARM REV CODE 636 W HCPCS: Performed by: INTERNAL MEDICINE

## 2019-06-03 PROCEDURE — S0028 INJECTION, FAMOTIDINE, 20 MG: HCPCS | Performed by: HOSPITALIST

## 2019-06-03 PROCEDURE — 96376 TX/PRO/DX INJ SAME DRUG ADON: CPT | Performed by: FAMILY MEDICINE

## 2019-06-03 RX ORDER — VANCOMYCIN HCL IN 5 % DEXTROSE 1G/250ML
1000 PLASTIC BAG, INJECTION (ML) INTRAVENOUS
Status: DISCONTINUED | OUTPATIENT
Start: 2019-06-13 | End: 2019-06-04 | Stop reason: ALTCHOICE

## 2019-06-03 RX ORDER — FUROSEMIDE 10 MG/ML
20 INJECTION INTRAMUSCULAR; INTRAVENOUS ONCE
Status: DISCONTINUED | OUTPATIENT
Start: 2019-06-03 | End: 2019-06-03

## 2019-06-03 RX ADMIN — VANCOMYCIN HYDROCHLORIDE 2500 MG: 1 INJECTION, POWDER, LYOPHILIZED, FOR SOLUTION INTRAVENOUS at 06:06

## 2019-06-03 RX ADMIN — ARFORMOTEROL TARTRATE 15 MCG: 15 SOLUTION RESPIRATORY (INHALATION) at 08:06

## 2019-06-03 RX ADMIN — FINASTERIDE 5 MG: 5 TABLET, FILM COATED ORAL at 08:06

## 2019-06-03 RX ADMIN — SODIUM CHLORIDE: 0.45 INJECTION, SOLUTION INTRAVENOUS at 04:06

## 2019-06-03 RX ADMIN — BUDESONIDE 0.5 MG: 0.5 SUSPENSION RESPIRATORY (INHALATION) at 07:06

## 2019-06-03 RX ADMIN — ATORVASTATIN CALCIUM 10 MG: 10 TABLET, FILM COATED ORAL at 08:06

## 2019-06-03 RX ADMIN — METOPROLOL SUCCINATE 25 MG: 25 TABLET, EXTENDED RELEASE ORAL at 08:06

## 2019-06-03 RX ADMIN — ASPIRIN 81 MG: 81 TABLET, COATED ORAL at 08:06

## 2019-06-03 RX ADMIN — FAMOTIDINE 20 MG: 20 INJECTION, SOLUTION INTRAVENOUS at 08:06

## 2019-06-03 RX ADMIN — BUDESONIDE 0.5 MG: 0.5 SUSPENSION RESPIRATORY (INHALATION) at 08:06

## 2019-06-03 RX ADMIN — ARFORMOTEROL TARTRATE 15 MCG: 15 SOLUTION RESPIRATORY (INHALATION) at 07:06

## 2019-06-03 NOTE — HPI
Thai Rincon is a 88 y.o. male patient with PMHx of BPH and HTN who presented to the ER for SOB which began at around 1:30pm. Associated sxs include fatigue. The patient denies any modifying factors. Daughter notes patient has been having nausea, fever, chills, and urinary frequency since the drive back from [place]. Patient denies CP, cough, gonzalez, pnd, orthopnea, palpitations, diaphoresis, headache, blurred vision, numbness, tingling, dizziness, localized weakness, abdominal pain, blood in stools, melena, hematemesis,  urgency, dysuria or hematuria. In the ER the patient was found to have a WBC of 13K. CXR showed discoid atelectasis of the right mid lung. In the ER the patient was found to have a low fever of 100.6 F.

## 2019-06-03 NOTE — H&P
[facility] Hospital Medicine  History & Physical    Patient Name: Thai Rincon  MRN: [id number]  Admission Date: 6/2/2019  Attending Physician: Yunior Joe MD   Primary Care Provider: Enoc Frsot MD         Patient information was obtained from patient, relative(s) and ER records.     Subjective:     Principal Problem:Community acquired pneumonia    Chief Complaint:   Chief Complaint   Patient presents with    Fever     pt c/o fever and malaise        HPI: Thai Rincon is a 88 y.o. male patient with PMHx of BPH and HTN who presented to the ER for SOB which  began at  around 1:30pm. Associated sxs include fatigue.  The patient denies any modifying factors. Daughter notes patient has been having nausea, fever, chills, and urinary frequency since the drive back from [place]. Patient denies CP, cough, gonzalez, pnd, orthopnea, palpitations, diaphoresis, headache, blurred vision, numbness, tingling, dizziness, localized weakness, abdominal pain, blood in stools, melena, hematemesis,  urgency, dysuria or hematuria. In the ER the patient was found to have a WBC of 13K. CXR showed discoid atelectasis of the right mid lung. In the ER the patient was found to have a low fever of 100.6 F.         Past Medical History:   Diagnosis Date    BPH (benign prostatic hyperplasia)     Glaucoma     Hypertension     Kidney stones        Past Surgical History:   Procedure Laterality Date    HERNIA REPAIR Right 2010       Review of patient's allergies indicates:  No Known Allergies    No current facility-administered medications on file prior to encounter.      Current Outpatient Medications on File Prior to Encounter   Medication Sig    albuterol-ipratropium  mcg (COMBIVENT)  mcg/actuation inhaler Inhale 2 puffs into the lungs every 6 (six) hours as needed for Wheezing.    aspirin 81 MG Chew Take 81 mg by mouth once daily.    atorvastatin (LIPITOR) 10 MG tablet Take 10 mg by mouth once daily.     finasteride (PROSCAR) 5 mg tablet Take 5 mg by mouth once daily.    furosemide (LASIX) 20 MG tablet Take 20 mg by mouth once daily.    losartan (COZAAR) 50 MG tablet Take 50 mg by mouth once daily.    metoprolol succinate (TOPROL-XL) 25 MG 24 hr tablet Take 25 mg by mouth once daily.    acetaminophen (TYLENOL) 500 MG tablet Take 500 mg by mouth every 6 (six) hours as needed for Pain.    albuterol (VENTOLIN HFA) 90 mcg/actuation inhaler Inhale 2 puffs into the lungs every 6 (six) hours as needed for Wheezing. Rescue    fluticasone (FLONASE) 50 mcg/actuation nasal spray 1 spray by Each Nare route once daily.    loratadine (CLARITIN) 10 mg tablet Take 10 mg by mouth daily as needed for Allergies.     Family History     History reviewed and negative.         Tobacco Use    Smoking status: Never Smoker    Smokeless tobacco: Never Used   Substance and Sexual Activity    Alcohol use: No     Alcohol/week: 0.0 oz    Drug use: No    Sexual activity: Not Currently     Review of Systems   Constitutional: Positive for chills and fever. Negative for activity change, appetite change, fatigue and unexpected weight change.   HENT: Negative for congestion, facial swelling, rhinorrhea, sinus pressure, sneezing and sore throat.    Eyes: Negative for discharge, redness and visual disturbance.   Respiratory: Positive for cough and shortness of breath. Negative for apnea, chest tightness, wheezing and stridor.    Cardiovascular: Negative for chest pain, palpitations and leg swelling.   Gastrointestinal: Positive for nausea. Negative for abdominal distention, abdominal pain, anal bleeding, blood in stool, constipation, diarrhea and vomiting.   Genitourinary: Negative for difficulty urinating, discharge, dysuria, frequency and hematuria.   Musculoskeletal: Negative for arthralgias, back pain, gait problem, joint swelling, myalgias, neck pain and neck stiffness.   Skin: Negative for color change and pallor.   Neurological:  Negative for dizziness, tremors, seizures, syncope, facial asymmetry, speech difficulty, weakness, light-headedness, numbness and headaches.   Psychiatric/Behavioral: Negative for behavioral problems, confusion, hallucinations and suicidal ideas. The patient is not nervous/anxious.    All other systems reviewed and are negative.    Objective:     Vital Signs (Most Recent):  Temp: 98 °F (36.7 °C) (06/03/19 0719)  Pulse: 69 (06/03/19 0756)  Resp: 18 (06/03/19 0756)  BP: 129/61 (06/03/19 0719)  SpO2: 97 % (06/03/19 0756) Vital Signs (24h Range):  Temp:  [97.4 °F (36.3 °C)-100.9 °F (38.3 °C)] 98 °F (36.7 °C)  Pulse:  [69-94] 69  Resp:  [16-24] 18  SpO2:  [95 %-97 %] 97 %  BP: (116-149)/(59-81) 129/61     Weight: 93.6 kg (206 lb 5.6 oz)  Body mass index is 30.47 kg/m².    Physical Exam   Constitutional: He is oriented to person, place, and time. He appears well-developed and well-nourished.   HENT:   Head: Normocephalic and atraumatic.   Eyes: Pupils are equal, round, and reactive to light. Conjunctivae are normal.   Neck: Normal range of motion. Neck supple.   Cardiovascular: Normal rate, regular rhythm, normal heart sounds and intact distal pulses.   No murmur heard.  Pulmonary/Chest: Effort normal and breath sounds normal. No respiratory distress.   Abdominal: Soft. Bowel sounds are normal. He exhibits no distension. There is no tenderness.   Musculoskeletal: He exhibits no edema, tenderness or deformity.   Neurological: He is alert and oriented to person, place, and time.   Skin: Skin is warm and dry. No rash noted. No erythema.   Psychiatric: He has a normal mood and affect. His behavior is normal.   Nursing note and vitals reviewed.        CRANIAL NERVES     CN III, IV, VI   Pupils are equal, round, and reactive to light.       Significant Labs: All pertinent labs within the past 24 hours have been reviewed.    Significant Imaging:   Imaging Results          X-Ray Chest AP Portable (Final result)  Result time  06/02/19 17:13:29    Final result by Dorian Gonzalez MD (06/02/19 17:13:29)                 Impression:      Discoid atelectasis right mid lung.  Otherwise no acute chest findings.      Electronically signed by: Dorian Gonzalez  Date:    06/02/2019  Time:    17:13             Narrative:    EXAMINATION:  XR CHEST AP PORTABLE    CLINICAL HISTORY:  Sepsis;    COMPARISON:  10/18/2006    FINDINGS:  Discoid atelectasis identified right mid lung.  Left lung clear.  Heart size within normal limits.No significant bony findings.                                  Assessment/Plan:     * Community acquired pneumonia  - CXR showed discoid atelectasis right mid lung  - start CTX/zithromax empirically   - duonebs q6h prn  - Incentive spirometer  - blood cx pending      Benign essential HTN  - Monitor VS   - Resume home meds       Anemia of chronic illness  - Monitor and transfuse if symptomatic  - CBC in am       Acute kidney injury superimposed on chronic kidney disease  - unclear baseline Cr from chart  - DAILY due to dehydration/IVVD  - 30ml/kg IVF bolus given in ER  - continue maintenance fluids NS @ 125cc/hr        VTE Risk Mitigation (From admission, onward)        Ordered     IP VTE HIGH RISK PATIENT  Once      06/02/19 1933     Place sequential compression device  Until discontinued      06/02/19 1933             Itz Valadez NP  Department of Hospital Medicine   [facility]

## 2019-06-03 NOTE — PLAN OF CARE
Problem: Adult Inpatient Plan of Care  Goal: Plan of Care Review  Outcome: Ongoing (interventions implemented as appropriate)  Discussed POC with pt, condition improving, resp effort better, vs wnl, nad noted, remains injury free, iv abx and ivfs continued.  Is treatments initiated repeat labs done. Will continue to monitor

## 2019-06-03 NOTE — SUBJECTIVE & OBJECTIVE
Past Medical History:   Diagnosis Date    BPH (benign prostatic hyperplasia)     Glaucoma     Hypertension     Kidney stones        Past Surgical History:   Procedure Laterality Date    HERNIA REPAIR Right 2010       Review of patient's allergies indicates:  No Known Allergies    No current facility-administered medications on file prior to encounter.      Current Outpatient Medications on File Prior to Encounter   Medication Sig    albuterol-ipratropium  mcg (COMBIVENT)  mcg/actuation inhaler Inhale 2 puffs into the lungs every 6 (six) hours as needed for Wheezing.    aspirin 81 MG Chew Take 81 mg by mouth once daily.    atorvastatin (LIPITOR) 10 MG tablet Take 10 mg by mouth once daily.    finasteride (PROSCAR) 5 mg tablet Take 5 mg by mouth once daily.    furosemide (LASIX) 20 MG tablet Take 20 mg by mouth once daily.    losartan (COZAAR) 50 MG tablet Take 50 mg by mouth once daily.    metoprolol succinate (TOPROL-XL) 25 MG 24 hr tablet Take 25 mg by mouth once daily.    acetaminophen (TYLENOL) 500 MG tablet Take 500 mg by mouth every 6 (six) hours as needed for Pain.    albuterol (VENTOLIN HFA) 90 mcg/actuation inhaler Inhale 2 puffs into the lungs every 6 (six) hours as needed for Wheezing. Rescue    fluticasone (FLONASE) 50 mcg/actuation nasal spray 1 spray by Each Nare route once daily.    loratadine (CLARITIN) 10 mg tablet Take 10 mg by mouth daily as needed for Allergies.     Family History     None        Tobacco Use    Smoking status: Never Smoker    Smokeless tobacco: Never Used   Substance and Sexual Activity    Alcohol use: No     Alcohol/week: 0.0 oz    Drug use: No    Sexual activity: Not Currently     Review of Systems   Constitutional: Positive for chills and fever. Negative for activity change, appetite change, fatigue and unexpected weight change.   HENT: Negative for congestion, facial swelling, rhinorrhea, sinus pressure, sneezing and sore throat.    Eyes:  Negative for discharge, redness and visual disturbance.   Respiratory: Positive for cough and shortness of breath. Negative for apnea, chest tightness, wheezing and stridor.    Cardiovascular: Negative for chest pain, palpitations and leg swelling.   Gastrointestinal: Positive for nausea. Negative for abdominal distention, abdominal pain, anal bleeding, blood in stool, constipation, diarrhea and vomiting.   Genitourinary: Negative for difficulty urinating, discharge, dysuria, frequency and hematuria.   Musculoskeletal: Negative for arthralgias, back pain, gait problem, joint swelling, myalgias, neck pain and neck stiffness.   Skin: Negative for color change and pallor.   Neurological: Negative for dizziness, tremors, seizures, syncope, facial asymmetry, speech difficulty, weakness, light-headedness, numbness and headaches.   Psychiatric/Behavioral: Negative for behavioral problems, confusion, hallucinations and suicidal ideas. The patient is not nervous/anxious.    All other systems reviewed and are negative.    Objective:     Vital Signs (Most Recent):  Temp: 98 °F (36.7 °C) (06/03/19 0719)  Pulse: 69 (06/03/19 0756)  Resp: 18 (06/03/19 0756)  BP: 129/61 (06/03/19 0719)  SpO2: 97 % (06/03/19 0756) Vital Signs (24h Range):  Temp:  [97.4 °F (36.3 °C)-100.9 °F (38.3 °C)] 98 °F (36.7 °C)  Pulse:  [69-94] 69  Resp:  [16-24] 18  SpO2:  [95 %-97 %] 97 %  BP: (116-149)/(59-81) 129/61     Weight: 93.6 kg (206 lb 5.6 oz)  Body mass index is 30.47 kg/m².    Physical Exam   Constitutional: He is oriented to person, place, and time. He appears well-developed and well-nourished.   HENT:   Head: Normocephalic and atraumatic.   Eyes: Pupils are equal, round, and reactive to light. Conjunctivae are normal.   Neck: Normal range of motion. Neck supple.   Cardiovascular: Normal rate, regular rhythm, normal heart sounds and intact distal pulses.   No murmur heard.  Pulmonary/Chest: Effort normal and breath sounds normal. No respiratory  distress.   Abdominal: Soft. Bowel sounds are normal. He exhibits no distension. There is no tenderness.   Musculoskeletal: He exhibits no edema, tenderness or deformity.   Neurological: He is alert and oriented to person, place, and time.   Skin: Skin is warm and dry. No rash noted. No erythema.   Psychiatric: He has a normal mood and affect. His behavior is normal.   Nursing note and vitals reviewed.        CRANIAL NERVES     CN III, IV, VI   Pupils are equal, round, and reactive to light.       Significant Labs: All pertinent labs within the past 24 hours have been reviewed.    Significant Imaging:   Imaging Results          X-Ray Chest AP Portable (Final result)  Result time 06/02/19 17:13:29    Final result by Dorian Gonzalez MD (06/02/19 17:13:29)                 Impression:      Discoid atelectasis right mid lung.  Otherwise no acute chest findings.      Electronically signed by: Dorian Gonzalez  Date:    06/02/2019  Time:    17:13             Narrative:    EXAMINATION:  XR CHEST AP PORTABLE    CLINICAL HISTORY:  Sepsis;    COMPARISON:  10/18/2006    FINDINGS:  Discoid atelectasis identified right mid lung.  Left lung clear.  Heart size within normal limits.No significant bony findings.

## 2019-06-03 NOTE — SUBJECTIVE & OBJECTIVE
Interval History: No further fever today. WBC trended up to 21K. Continue current management with IV ABX. Will continue to monitor. Blood cx positive gram positive cocci in chains resembling Strep. ABX changed to IV Vanc. Will repeat blood cx in am.        Review of Systems   Constitutional: Positive for chills and fever. Negative for activity change, appetite change, fatigue and unexpected weight change.   HENT: Negative for congestion, facial swelling, rhinorrhea, sinus pressure, sneezing and sore throat.    Eyes: Negative for discharge, redness and visual disturbance.   Respiratory: Positive for cough and shortness of breath. Negative for apnea, chest tightness, wheezing and stridor.    Cardiovascular: Negative for chest pain, palpitations and leg swelling.   Gastrointestinal: Positive for nausea. Negative for abdominal distention, abdominal pain, anal bleeding, blood in stool, constipation, diarrhea and vomiting.   Genitourinary: Negative for difficulty urinating, discharge, dysuria, frequency and hematuria.   Musculoskeletal: Negative for arthralgias, back pain, gait problem, joint swelling, myalgias, neck pain and neck stiffness.   Skin: Negative for color change and pallor.   Neurological: Negative for dizziness, tremors, seizures, syncope, facial asymmetry, speech difficulty, weakness, light-headedness, numbness and headaches.   Psychiatric/Behavioral: Negative for behavioral problems, confusion, hallucinations and suicidal ideas. The patient is not nervous/anxious.    All other systems reviewed and are negative.    Objective:     Vital Signs (Most Recent):  Temp: 97.3 °F (36.3 °C) (06/03/19 1600)  Pulse: 60 (06/03/19 1600)  Resp: 18 (06/03/19 1155)  BP: 127/67 (06/03/19 1600)  SpO2: 99 % (06/03/19 1600) Vital Signs (24h Range):  Temp:  [97.3 °F (36.3 °C)-100.4 °F (38 °C)] 97.3 °F (36.3 °C)  Pulse:  [60-90] 60  Resp:  [16-24] 18  SpO2:  [95 %-99 %] 99 %  BP: (114-149)/(59-70) 127/67     Weight: 93.6 kg (206  lb 5.6 oz)  Body mass index is 30.47 kg/m².    Intake/Output Summary (Last 24 hours) at 6/3/2019 1650  Last data filed at 6/3/2019 1300  Gross per 24 hour   Intake 5533.83 ml   Output --   Net 5533.83 ml      Physical Exam   Constitutional: He is oriented to person, place, and time. He appears well-developed and well-nourished.   HENT:   Head: Normocephalic and atraumatic.   Eyes: Pupils are equal, round, and reactive to light. Conjunctivae are normal.   Neck: Normal range of motion. Neck supple.   Cardiovascular: Normal rate, regular rhythm, normal heart sounds and intact distal pulses.   No murmur heard.  Pulmonary/Chest: Effort normal and breath sounds normal. No respiratory distress.   Abdominal: Soft. Bowel sounds are normal. He exhibits no distension. There is no tenderness.   Musculoskeletal: He exhibits no edema, tenderness or deformity.   Neurological: He is alert and oriented to person, place, and time.   Skin: Skin is warm and dry. No rash noted. No erythema.   Psychiatric: He has a normal mood and affect. His behavior is normal.   Nursing note and vitals reviewed.      Significant Labs: All pertinent labs within the past 24 hours have been reviewed.    Significant Imaging:   Imaging Results          X-Ray Chest AP Portable (Final result)  Result time 06/02/19 17:13:29    Final result by Dorian Gonzalez MD (06/02/19 17:13:29)                 Impression:      Discoid atelectasis right mid lung.  Otherwise no acute chest findings.      Electronically signed by: Dorian Gonzalez  Date:    06/02/2019  Time:    17:13             Narrative:    EXAMINATION:  XR CHEST AP PORTABLE    CLINICAL HISTORY:  Sepsis;    COMPARISON:  10/18/2006    FINDINGS:  Discoid atelectasis identified right mid lung.  Left lung clear.  Heart size within normal limits.No significant bony findings.

## 2019-06-03 NOTE — ASSESSMENT & PLAN NOTE
No further fever today. WBC trended up to 21K. Continue current management with IV ABX. Will continue to monitor. Blood cx positive gram positive cocci in chains resembling Strep. ABX changed to IV Vanc. Will repeat blood cx in am.

## 2019-06-03 NOTE — HOSPITAL COURSE
6/3/19 No further fever today. WBC trended up to 21K. Continue current management with IV ABX. Will continue to monitor. Blood cx positive gram positive cocci in chains resembling Strep - proved to be Strep Agalactiae.  ABX changed to IV Vanc. Will repeat blood cx in am, but pt difficult blood draw - one set drawn - proved NGTD.  Infectious Ds consult pending, results of 2 D ECHO is negative for vegetation. IV Vanco discontinued and Rocephin continued. Per Infectious Ds, Rocephin 2 grams IV x 2 weeks for strep bacteremia. PICC line placed. Daughter, Clarissa, was advised of plan for discharge. [facility]. Also, a follow up appointment with pt's PCP was arranged.  Pt was seen and examined and determined to be safe and stable for discharge.

## 2019-06-03 NOTE — ASSESSMENT & PLAN NOTE
- CXR showed discoid atelectasis right mid lung  - start CTX/zithromax empirically   - duonebs q6h prn  - Incentive spirometer  - blood cx pending

## 2019-06-03 NOTE — ASSESSMENT & PLAN NOTE
6/3/19 No further fever today. WBC trended up to 21K. Continue current management with IV ABX. Will continue to monitor. Blood cx positive gram positive cocci in chains resembling Strep. ABX changed to IV Vanc. Will repeat blood cx in am.

## 2019-06-03 NOTE — PROGRESS NOTES
[facility] Hospital Medicine  Progress Note    Patient Name: Thai Rincon  MRN: [id number]  Patient Class: IP- Inpatient   Admission Date: 6/2/2019  Length of Stay: 0 days  Attending Physician: Yunior Joe MD  Primary Care Provider: Enoc Frost MD        Subjective:     Principal Problem:Community acquired pneumonia    HPI:  Thai Rincon is a 88 y.o. male patient with PMHx of BPH and HTN who presented to the ER for SOB which  began at  around 1:30pm. Associated sxs include fatigue.  The patient denies any modifying factors. Daughter notes patient has been having nausea, fever, chills, and urinary frequency since the drive back from [place]. Patient denies CP, cough, gonzalez, pnd, orthopnea, palpitations, diaphoresis, headache, blurred vision, numbness, tingling, dizziness, localized weakness, abdominal pain, blood in stools, melena, hematemesis,  urgency, dysuria or hematuria. In the ER the patient was found to have a WBC of 13K. CXR showed discoid atelectasis of the right mid lung. In the ER the patient was found to have a low fever of 100.6 F.         Hospital Course:  6/3/19 No further fever today. WBC trended up to 21K. Continue current management with IV ABX. Will continue to monitor. Blood cx positive gram positive cocci in chains resembling Strep. ABX changed to IV Vanc. Will repeat blood cx in am.      Interval History: No further fever today. WBC trended up to 21K. Continue current management with IV ABX. Will continue to monitor. Blood cx positive gram positive cocci in chains resembling Strep. ABX changed to IV Vanc. Will repeat blood cx in am.        Review of Systems   Constitutional: Positive for chills and fever. Negative for activity change, appetite change, fatigue and unexpected weight change.   HENT: Negative for congestion, facial swelling, rhinorrhea, sinus pressure, sneezing and sore throat.    Eyes: Negative for discharge, redness and visual disturbance.   Respiratory:  Positive for cough and shortness of breath. Negative for apnea, chest tightness, wheezing and stridor.    Cardiovascular: Negative for chest pain, palpitations and leg swelling.   Gastrointestinal: Positive for nausea. Negative for abdominal distention, abdominal pain, anal bleeding, blood in stool, constipation, diarrhea and vomiting.   Genitourinary: Negative for difficulty urinating, discharge, dysuria, frequency and hematuria.   Musculoskeletal: Negative for arthralgias, back pain, gait problem, joint swelling, myalgias, neck pain and neck stiffness.   Skin: Negative for color change and pallor.   Neurological: Negative for dizziness, tremors, seizures, syncope, facial asymmetry, speech difficulty, weakness, light-headedness, numbness and headaches.   Psychiatric/Behavioral: Negative for behavioral problems, confusion, hallucinations and suicidal ideas. The patient is not nervous/anxious.    All other systems reviewed and are negative.    Objective:     Vital Signs (Most Recent):  Temp: 97.3 °F (36.3 °C) (06/03/19 1600)  Pulse: 60 (06/03/19 1600)  Resp: 18 (06/03/19 1155)  BP: 127/67 (06/03/19 1600)  SpO2: 99 % (06/03/19 1600) Vital Signs (24h Range):  Temp:  [97.3 °F (36.3 °C)-100.4 °F (38 °C)] 97.3 °F (36.3 °C)  Pulse:  [60-90] 60  Resp:  [16-24] 18  SpO2:  [95 %-99 %] 99 %  BP: (114-149)/(59-70) 127/67     Weight: 93.6 kg (206 lb 5.6 oz)  Body mass index is 30.47 kg/m².    Intake/Output Summary (Last 24 hours) at 6/3/2019 1650  Last data filed at 6/3/2019 1300  Gross per 24 hour   Intake 5533.83 ml   Output --   Net 5533.83 ml      Physical Exam   Constitutional: He is oriented to person, place, and time. He appears well-developed and well-nourished.   HENT:   Head: Normocephalic and atraumatic.   Eyes: Pupils are equal, round, and reactive to light. Conjunctivae are normal.   Neck: Normal range of motion. Neck supple.   Cardiovascular: Normal rate, regular rhythm, normal heart sounds and intact distal  pulses.   No murmur heard.  Pulmonary/Chest: Effort normal and breath sounds normal. No respiratory distress.   Abdominal: Soft. Bowel sounds are normal. He exhibits no distension. There is no tenderness.   Musculoskeletal: He exhibits no edema, tenderness or deformity.   Neurological: He is alert and oriented to person, place, and time.   Skin: Skin is warm and dry. No rash noted. No erythema.   Psychiatric: He has a normal mood and affect. His behavior is normal.   Nursing note and vitals reviewed.      Significant Labs: All pertinent labs within the past 24 hours have been reviewed.    Significant Imaging:   Imaging Results          X-Ray Chest AP Portable (Final result)  Result time 06/02/19 17:13:29    Final result by Dorian Gonzalez MD (06/02/19 17:13:29)                 Impression:      Discoid atelectasis right mid lung.  Otherwise no acute chest findings.      Electronically signed by: Dorian Gonzalez  Date:    06/02/2019  Time:    17:13             Narrative:    EXAMINATION:  XR CHEST AP PORTABLE    CLINICAL HISTORY:  Sepsis;    COMPARISON:  10/18/2006    FINDINGS:  Discoid atelectasis identified right mid lung.  Left lung clear.  Heart size within normal limits.No significant bony findings.                                  Assessment/Plan:      * Community acquired pneumonia  - CXR showed discoid atelectasis right mid lung  - start CTX/zithromax empirically   - duonebs q6h prn  - Incentive spirometer  - blood cx pending      Gram-positive cocci bacteremia  6/3/19 No further fever today. WBC trended up to 21K. Continue current management with IV ABX. Will continue to monitor. Blood cx positive gram positive cocci in chains resembling Strep. ABX changed to IV Vanc. Will repeat blood cx in am.          Sepsis  No further fever today. WBC trended up to 21K. Continue current management with IV ABX. Will continue to monitor. Blood cx positive gram positive cocci in chains resembling Strep. ABX changed  to IV Vanc. Will repeat blood cx in am.          Benign essential HTN  - Monitor VS   - Resume home meds       Anemia of chronic illness  - Monitor and transfuse if symptomatic  - CBC in am       Acute kidney injury superimposed on chronic kidney disease  - unclear baseline Cr from chart  - DAILY due to dehydration/IVVD  - 30ml/kg IVF bolus given in ER  - continue maintenance fluids NS @ 125cc/hr  6/3/19 continue to monitor renal function. Continue IV fluids.       VTE Risk Mitigation (From admission, onward)        Ordered     IP VTE HIGH RISK PATIENT  Once      06/02/19 1933     Place sequential compression device  Until discontinued      06/02/19 1933              Itz Valadez NP  Department of Hospital Medicine   [facility]

## 2019-06-03 NOTE — ASSESSMENT & PLAN NOTE
- unclear baseline Cr from chart  - DAILY due to dehydration/IVVD  - 30ml/kg IVF bolus given in ER  - continue maintenance fluids NS @ 125cc/hr

## 2019-06-03 NOTE — ASSESSMENT & PLAN NOTE
- unclear baseline Cr from chart  - DAILY due to dehydration/IVVD  - 30ml/kg IVF bolus given in ER  - continue maintenance fluids NS @ 125cc/hr  6/3/19 continue to monitor renal function. Continue IV fluids.

## 2019-06-03 NOTE — PLAN OF CARE
Problem: Adult Inpatient Plan of Care  Goal: Plan of Care Review  Outcome: Ongoing (interventions implemented as appropriate)  POC reviewed, including indications and possible side effects of administered medications. Patient verbalized understanding and teach back. No adverse reactions noted. Patient denies pain and n/v. Administered medications per order. VSS. Afebrile. No s/s of acute distress noted. Patient remains free of falls and injuries during shift. Friend at bedside. Will continue to monitor.    Chart check complete.

## 2019-06-04 PROBLEM — A49.1 STREPTOCOCCUS AGALACTIAE INFECTION: Status: ACTIVE | Noted: 2019-06-03

## 2019-06-04 LAB
ANION GAP SERPL CALC-SCNC: 9 MMOL/L (ref 8–16)
BASOPHILS # BLD AUTO: 0 K/UL (ref 0–0.2)
BASOPHILS NFR BLD: 0 % (ref 0–1.9)
BUN SERPL-MCNC: 33 MG/DL (ref 8–23)
CALCIUM SERPL-MCNC: 8.6 MG/DL (ref 8.7–10.5)
CHLORIDE SERPL-SCNC: 101 MMOL/L (ref 95–110)
CO2 SERPL-SCNC: 22 MMOL/L (ref 23–29)
CREAT SERPL-MCNC: 1.8 MG/DL (ref 0.5–1.4)
DIFFERENTIAL METHOD: ABNORMAL
EOSINOPHIL # BLD AUTO: 0.1 K/UL (ref 0–0.5)
EOSINOPHIL NFR BLD: 1 % (ref 0–8)
ERYTHROCYTE [DISTWIDTH] IN BLOOD BY AUTOMATED COUNT: 15.9 % (ref 11.5–14.5)
EST. GFR  (AFRICAN AMERICAN): 38 ML/MIN/1.73 M^2
EST. GFR  (NON AFRICAN AMERICAN): 33 ML/MIN/1.73 M^2
ESTIMATED PA SYSTOLIC PRESSURE: 41.69
GLUCOSE SERPL-MCNC: 219 MG/DL (ref 70–110)
HCT VFR BLD AUTO: 35.9 % (ref 40–54)
HGB BLD-MCNC: 11.7 G/DL (ref 14–18)
LYMPHOCYTES # BLD AUTO: 0.9 K/UL (ref 1–4.8)
LYMPHOCYTES NFR BLD: 6.1 % (ref 18–48)
MAGNESIUM SERPL-MCNC: 2.1 MG/DL (ref 1.6–2.6)
MCH RBC QN AUTO: 30.4 PG (ref 27–31)
MCHC RBC AUTO-ENTMCNC: 32.6 G/DL (ref 32–36)
MCV RBC AUTO: 93 FL (ref 82–98)
MONOCYTES # BLD AUTO: 0.9 K/UL (ref 0.3–1)
MONOCYTES NFR BLD: 6.1 % (ref 4–15)
NEUTROPHILS # BLD AUTO: 12.3 K/UL (ref 1.8–7.7)
NEUTROPHILS NFR BLD: 87.2 % (ref 38–73)
OVALOCYTES BLD QL SMEAR: ABNORMAL
PHOSPHATE SERPL-MCNC: 3.3 MG/DL (ref 2.7–4.5)
PLATELET # BLD AUTO: 82 K/UL (ref 150–350)
PMV BLD AUTO: ABNORMAL FL (ref 9.2–12.9)
POIKILOCYTOSIS BLD QL SMEAR: SLIGHT
POTASSIUM SERPL-SCNC: 4.7 MMOL/L (ref 3.5–5.1)
RBC # BLD AUTO: 3.85 M/UL (ref 4.6–6.2)
RETIRED EF AND QEF - SEE NOTES: 60 (ref 55–65)
SODIUM SERPL-SCNC: 132 MMOL/L (ref 136–145)
TARGETS BLD QL SMEAR: ABNORMAL
WBC # BLD AUTO: 14.2 K/UL (ref 3.9–12.7)

## 2019-06-04 PROCEDURE — 93306 TTE W/DOPPLER COMPLETE: CPT

## 2019-06-04 PROCEDURE — 80048 BASIC METABOLIC PNL TOTAL CA: CPT

## 2019-06-04 PROCEDURE — S0028 INJECTION, FAMOTIDINE, 20 MG: HCPCS | Performed by: HOSPITALIST

## 2019-06-04 PROCEDURE — 36415 COLL VENOUS BLD VENIPUNCTURE: CPT

## 2019-06-04 PROCEDURE — 93306 TTE W/DOPPLER COMPLETE: CPT | Mod: 26,,, | Performed by: INTERNAL MEDICINE

## 2019-06-04 PROCEDURE — 25000003 PHARM REV CODE 250: Performed by: NURSE PRACTITIONER

## 2019-06-04 PROCEDURE — 87040 BLOOD CULTURE FOR BACTERIA: CPT

## 2019-06-04 PROCEDURE — 94640 AIRWAY INHALATION TREATMENT: CPT

## 2019-06-04 PROCEDURE — 63600175 PHARM REV CODE 636 W HCPCS: Performed by: NURSE PRACTITIONER

## 2019-06-04 PROCEDURE — 84100 ASSAY OF PHOSPHORUS: CPT

## 2019-06-04 PROCEDURE — 25000242 PHARM REV CODE 250 ALT 637 W/ HCPCS: Performed by: HOSPITALIST

## 2019-06-04 PROCEDURE — 85025 COMPLETE CBC W/AUTO DIFF WBC: CPT

## 2019-06-04 PROCEDURE — 94799 UNLISTED PULMONARY SVC/PX: CPT

## 2019-06-04 PROCEDURE — 99900035 HC TECH TIME PER 15 MIN (STAT)

## 2019-06-04 PROCEDURE — 93306 2D ECHO WITH COLOR FLOW DOPPLER: ICD-10-PCS | Mod: 26,,, | Performed by: INTERNAL MEDICINE

## 2019-06-04 PROCEDURE — 25000003 PHARM REV CODE 250: Performed by: HOSPITALIST

## 2019-06-04 PROCEDURE — 83735 ASSAY OF MAGNESIUM: CPT

## 2019-06-04 PROCEDURE — 11000001 HC ACUTE MED/SURG PRIVATE ROOM

## 2019-06-04 RX ORDER — HYDRALAZINE HYDROCHLORIDE 10 MG/1
10 TABLET, FILM COATED ORAL EVERY 8 HOURS PRN
Status: DISCONTINUED | OUTPATIENT
Start: 2019-06-04 | End: 2019-06-04

## 2019-06-04 RX ORDER — LOSARTAN POTASSIUM 50 MG/1
50 TABLET ORAL DAILY
Status: DISCONTINUED | OUTPATIENT
Start: 2019-06-05 | End: 2019-06-06 | Stop reason: HOSPADM

## 2019-06-04 RX ORDER — NAPROXEN SODIUM 220 MG/1
81 TABLET, FILM COATED ORAL DAILY
Status: DISCONTINUED | OUTPATIENT
Start: 2019-06-05 | End: 2019-06-04

## 2019-06-04 RX ORDER — HYDRALAZINE HYDROCHLORIDE 25 MG/1
25 TABLET, FILM COATED ORAL EVERY 8 HOURS PRN
Status: DISCONTINUED | OUTPATIENT
Start: 2019-06-05 | End: 2019-06-06 | Stop reason: HOSPADM

## 2019-06-04 RX ADMIN — CEFTRIAXONE 1 G: 1 INJECTION, SOLUTION INTRAVENOUS at 09:06

## 2019-06-04 RX ADMIN — METOPROLOL SUCCINATE 25 MG: 25 TABLET, EXTENDED RELEASE ORAL at 09:06

## 2019-06-04 RX ADMIN — ARFORMOTEROL TARTRATE 15 MCG: 15 SOLUTION RESPIRATORY (INHALATION) at 08:06

## 2019-06-04 RX ADMIN — BUDESONIDE 0.5 MG: 0.5 SUSPENSION RESPIRATORY (INHALATION) at 08:06

## 2019-06-04 RX ADMIN — HYDRALAZINE HYDROCHLORIDE 10 MG: 10 TABLET, FILM COATED ORAL at 08:06

## 2019-06-04 RX ADMIN — HYDRALAZINE HYDROCHLORIDE 10 MG: 10 TABLET, FILM COATED ORAL at 04:06

## 2019-06-04 RX ADMIN — ASPIRIN 81 MG: 81 TABLET, COATED ORAL at 09:06

## 2019-06-04 RX ADMIN — HYDRALAZINE HYDROCHLORIDE 25 MG: 25 TABLET, FILM COATED ORAL at 11:06

## 2019-06-04 RX ADMIN — BUDESONIDE 0.5 MG: 0.5 SUSPENSION RESPIRATORY (INHALATION) at 07:06

## 2019-06-04 RX ADMIN — FAMOTIDINE 20 MG: 20 INJECTION, SOLUTION INTRAVENOUS at 09:06

## 2019-06-04 RX ADMIN — ARFORMOTEROL TARTRATE 15 MCG: 15 SOLUTION RESPIRATORY (INHALATION) at 07:06

## 2019-06-04 RX ADMIN — FINASTERIDE 5 MG: 5 TABLET, FILM COATED ORAL at 09:06

## 2019-06-04 RX ADMIN — ATORVASTATIN CALCIUM 10 MG: 10 TABLET, FILM COATED ORAL at 09:06

## 2019-06-04 NOTE — ASSESSMENT & PLAN NOTE
- CXR showed discoid atelectasis right mid lung  - start CTX/zithromax empirically   - duonebs q6h prn  - Incentive spirometer  - blood cx - Strep Agalactaie - Infectious Ds consult pending

## 2019-06-04 NOTE — ASSESSMENT & PLAN NOTE
- unclear baseline Cr from chart  - DAILY due to dehydration/IVVD  - 30ml/kg IVF bolus given in ER  - continue maintenance fluids NS @ 125cc/hr  6/3/19 continue to monitor renal function. Continue IV fluids.   6/4 - likely has degree of CKD (1.9, 1.6, 1.8)

## 2019-06-04 NOTE — PROGRESS NOTES
[facility] Hospital Medicine  Progress Note    Patient Name: Thai Rincon  MRN: [id number]  Patient Class: IP- Inpatient   Admission Date: 6/2/2019  Length of Stay: 1 days  Attending Physician: Yunior Joe MD  Primary Care Provider: Enoc Frost MD        Subjective:     Principal Problem:Community acquired pneumonia    HPI:  Thai Rincon is a 88 y.o. male patient with PMHx of BPH and HTN who presented to the ER for SOB which  began at  around 1:30pm. Associated sxs include fatigue.  The patient denies any modifying factors. Daughter notes patient has been having nausea, fever, chills, and urinary frequency since the drive back from [place]. Patient denies CP, cough, gonzalez, pnd, orthopnea, palpitations, diaphoresis, headache, blurred vision, numbness, tingling, dizziness, localized weakness, abdominal pain, blood in stools, melena, hematemesis,  urgency, dysuria or hematuria. In the ER the patient was found to have a WBC of 13K. CXR showed discoid atelectasis of the right mid lung. In the ER the patient was found to have a low fever of 100.6 F.         Hospital Course:  6/3/19 No further fever today. WBC trended up to 21K. Continue current management with IV ABX. Will continue to monitor. Blood cx positive gram positive cocci in chains resembling Strep - proved to be Strep Agalactiae.  ABX changed to IV Vanc. Will repeat blood cx in am, but pt difficult blood draw - one set drawn.  Infectious Ds consult pending, results of 2 D ECHO pending to evaluate for vegetation - results pending.     Interval History: Pt with no significant complaints except that he experience severe chills upon arrival. Explained findings to patient and significant other. Denies any dental complaints, recent dental work, upper respiratory symptoms. WBC trending down.     Review of Systems   Constitutional: Positive for chills and fever. Negative for activity change, appetite change, fatigue and unexpected weight change.    HENT: Negative for congestion, facial swelling, rhinorrhea, sinus pressure, sneezing and sore throat.    Eyes: Negative for discharge, redness and visual disturbance.   Respiratory: Positive for cough and shortness of breath. Negative for apnea, chest tightness, wheezing and stridor.    Cardiovascular: Negative for chest pain, palpitations and leg swelling.   Gastrointestinal: Positive for nausea. Negative for abdominal distention, abdominal pain, anal bleeding, blood in stool, constipation, diarrhea and vomiting.   Genitourinary: Negative for difficulty urinating, discharge, dysuria, frequency and hematuria.   Musculoskeletal: Negative for arthralgias, back pain, gait problem, joint swelling, myalgias, neck pain and neck stiffness.   Skin: Negative for color change and pallor.   Neurological: Negative for dizziness, tremors, seizures, syncope, facial asymmetry, speech difficulty, weakness, light-headedness, numbness and headaches.   Psychiatric/Behavioral: Negative for behavioral problems, confusion, hallucinations and suicidal ideas. The patient is not nervous/anxious.    All other systems reviewed and are negative.    Objective:     Vital Signs (Most Recent):  Temp: 98.4 °F (36.9 °C) (06/04/19 1224)  Pulse: 63 (06/04/19 1224)  Resp: 18 (06/04/19 1224)  BP: (!) 169/86 (06/04/19 1408)  SpO2: 97 % (06/04/19 1224) Vital Signs (24h Range):  Temp:  [97.3 °F (36.3 °C)-98.4 °F (36.9 °C)] 98.4 °F (36.9 °C)  Pulse:  [54-65] 63  Resp:  [12-20] 18  SpO2:  [96 %-100 %] 97 %  BP: (126-193)/(72-93) 169/86     Weight: 93.6 kg (206 lb 5.6 oz)  Body mass index is 30.47 kg/m².    Intake/Output Summary (Last 24 hours) at 6/4/2019 1601  Last data filed at 6/4/2019 1600  Gross per 24 hour   Intake 1020 ml   Output --   Net 1020 ml      Physical Exam   Constitutional: He is oriented to person, place, and time. He appears well-developed and well-nourished.   HENT:   Head: Normocephalic and atraumatic.   Eyes: Pupils are equal, round,  and reactive to light. Conjunctivae are normal.   Neck: Normal range of motion. Neck supple.   Cardiovascular: Normal rate, regular rhythm and normal heart sounds.   No murmur heard.  Pulmonary/Chest: Effort normal. No respiratory distress. He has decreased breath sounds.   Abdominal: Soft. Bowel sounds are normal. He exhibits no distension. There is no tenderness.   Musculoskeletal: He exhibits no edema, tenderness or deformity.   Neurological: He is alert and oriented to person, place, and time.   Skin: Skin is warm and dry. No rash noted. No erythema.   Psychiatric: He has a normal mood and affect. His behavior is normal.   Nursing note and vitals reviewed.      Significant Labs:   CBC:   Recent Labs   Lab 06/03/19  0750 06/03/19  1438 06/04/19  0621   WBC 20.39* 21.52* 14.20*   HGB 11.9* 11.6* 11.7*   HCT 36.9* 35.3* 35.9*   * 113* 82*     CMP:   Recent Labs   Lab 06/02/19  1658 06/03/19  0750 06/03/19  1439 06/04/19  0621    137 137 132*   K 4.6 4.5 5.2* 4.7    105 109 101   CO2 26 26 17* 22*   * 118* 131* 219*   BUN 30* 25* 29* 33*   CREATININE 1.9* 1.6* 1.6* 1.8*   CALCIUM 9.7 8.9 8.6* 8.6*   PROT 7.0  --   --   --    ALBUMIN 3.7 3.0*  --   --    BILITOT 0.6  --   --   --    ALKPHOS 73  --   --   --    AST 18  --   --   --    ALT 25  --   --   --    ANIONGAP 11 6* 11 9   EGFRNONAA 31* 38* 38* 33*     All pertinent labs within the past 24 hours have been reviewed.    Significant Imaging: I have reviewed all pertinent imaging results/findings within the past 24 hours.    Assessment/Plan:      * Community acquired pneumonia  - CXR showed discoid atelectasis right mid lung  - start CTX/zithromax empirically   - duonebs q6h prn  - Incentive spirometer  - blood cx - Strep Agalactaie - Infectious Ds consult pending      Streptococcus agalactiae infection-bacteremia   Blood cx positive gram positive cocci in chains resembling Strep. ABX changed to IV Vanc.   Repeat blood cultures drawn  6/4  Infectious Ds consult pending  2 d ECHO results pending        Sepsis  Resolved  Has bacteremia  Vital signs stable      Benign essential HTN  - Monitor VS - blood pressure fluctuating  - Resume home meds - losartan and metoprolol      Anemia of chronic illness  - Monitor and transfuse if symptomatic  - CBC in am       Acute kidney injury superimposed on chronic kidney disease  - unclear baseline Cr from chart  - DAILY due to dehydration/IVVD  - 30ml/kg IVF bolus given in ER  - continue maintenance fluids NS @ 125cc/hr  6/3/19 continue to monitor renal function. Continue IV fluids.   6/4 - likely has degree of CKD (1.9, 1.6, 1.8)      VTE Risk Mitigation (From admission, onward)        Ordered     IP VTE HIGH RISK PATIENT  Once      06/02/19 1933     Place sequential compression device  Until discontinued      06/02/19 1933              Delma French NP  Department of Hospital Medicine   [facility]

## 2019-06-04 NOTE — ASSESSMENT & PLAN NOTE
Blood cx positive gram positive cocci in chains resembling Strep. ABX changed to IV Vanc.   Repeat blood cultures drawn 6/4  Infectious Ds consult pending  2 d ECHO results pending

## 2019-06-04 NOTE — CONSULTS
Pharmacokinetic Initial Assessment: IV Vancomycin    Assessment/Plan:    Initiate intravenous vancomycin with loading dose of 2,500 mg once with subsequent doses when random concentrations are less than 25 mcg/ml  Desired empiric serum trough concentration is 10 to 20 mcg/mL.  Draw vancomycin random level on 6/4 at 1800.  Pharmacy will continue to follow and monitor vancomycin.      Please contact pharmacy at extension [phone] with any questions regarding this assessment.     Thank you for the consult,   Princess Campo     Patient brief summary:  Thai Rincon is a 88 y.o. male initiated on antimicrobial therapy with IV Vancomycin for treatment of suspected bacteremia    Drug Allergies:   Review of patient's allergies indicates:  No Known Allergies    Actual Body Weight:   93.6 kg    Renal Function:   Estimated Creatinine Clearance: 36.1 mL/min (A) (based on SCr of 1.6 mg/dL (H)).,     CBC (last 72 hours):  Recent Labs   Lab Result Units 06/02/19  1658 06/03/19  0750 06/03/19  1438   WBC K/uL 13.74* 20.39* 21.52*   Hemoglobin g/dL 12.8* 11.9* 11.6*   Hematocrit % 39.2* 36.9* 35.3*   Platelets K/uL 128* 106* 113*   Gran% % 93.5* 93.6* 87.0*   Lymph% % 1.4* 2.6* 2.0*   Mono% % 4.7 3.8* 11.0   Eosinophil% % 0.4 0.0 0.0   Basophil% % 0.0 0.0 0.0   Differential Method  Automated Automated Manual       Metabolic Panel (last 72 hours):  Recent Labs   Lab Result Units 06/02/19  1658 06/02/19  1713 06/03/19  0750 06/03/19  1439   Sodium mmol/L 138  --  137 137   Potassium mmol/L 4.6  --  4.5 5.2*   Chloride mmol/L 101  --  105 109   CO2 mmol/L 26  --  26 17*   Glucose mg/dL 140*  --  118* 131*   Glucose, UA   --  Negative  --   --    BUN, Bld mg/dL 30*  --  25* 29*   Creatinine mg/dL 1.9*  --  1.6* 1.6*   Albumin g/dL 3.7  --  3.0*  --    Total Bilirubin mg/dL 0.6  --   --   --    Alkaline Phosphatase U/L 73  --   --   --    AST U/L 18  --   --   --    ALT U/L 25  --   --   --    Magnesium mg/dL 1.8  --  1.6  --     Phosphorus mg/dL 2.4*  --  2.6*  2.6*  --        Microbiologic Results:  Microbiology Results (last 7 days)     Procedure Component Value Units Date/Time    Blood culture x two cultures. Draw prior to antibiotics. [[id number]] Collected:  06/02/19 1658    Order Status:  Completed Specimen:  Blood from Peripheral, Antecubital, Right Updated:  06/03/19 0952     Blood Culture, Routine Gram stain andria bottle: Gram positive cocci in chains resembling Strep      Blood Culture, Routine Results called to and read back by:Xenia Cevallos RN 06/03/2019  06:20     Blood Culture, Routine Gram stain aer bottle: Gram positive cocci in chains resembling Strep     Blood Culture, Routine 06/03/2019  09:51      Blood Culture, Routine Positive results previously called    Narrative:       Aerobic and anaerobic    Blood culture x two cultures. Draw prior to antibiotics. [[id number]] Collected:  06/02/19 1658    Order Status:  Completed Specimen:  Blood from Peripheral, Antecubital, Left Updated:  06/03/19 0841     Blood Culture, Routine Gram stain andria bottle: Gram positive cocci in chains resembling Strep      Blood Culture, Routine Results called to and read back by:Xenia Cevallos RN 06/03/2019  06:20     Blood Culture, Routine Gram stain aer bottle: Gram positive cocci in chains resembling Strep     Blood Culture, Routine 06/03/2019  08:41      Blood Culture, Routine Positive results previously called    Narrative:       Aerobic and anaerobic    Influenza A & B by Molecular [[id number]] Collected:  06/02/19 1712    Order Status:  Completed Specimen:  Nasopharyngeal Swab Updated:  06/02/19 1834     Influenza A, Molecular Negative     Influenza B, Molecular Negative     Flu A & B Source Nasal swab

## 2019-06-04 NOTE — CHAPLAIN
Initial visit with patient.  Provided ministries of listening, presence, and prayer.  Pt had no other spiritual needs and I will follow up as needed.    Chaplain Loy Contreras M.Div., [facility]

## 2019-06-04 NOTE — PLAN OF CARE
Problem: Adult Inpatient Plan of Care  Goal: Patient-Specific Goal (Individualization)  Outcome: Ongoing (interventions implemented as appropriate)  Free from injury. Pain controlled. IV fluids. Antibiotic therapy. Independent. No distress noted. Chart check completed.

## 2019-06-04 NOTE — PLAN OF CARE
Problem: Adult Inpatient Plan of Care  Goal: Plan of Care Review  Outcome: Ongoing (interventions implemented as appropriate)  Plan of care and medication reviewed with patient. Verbalized understanding using teach back method. Fall precautions in place. Patient remained free from falls and/or injuries. Bed low and locked. Side rails up x2. Personal items and call light within reach. Chart Check complete

## 2019-06-04 NOTE — PLAN OF CARE
CM met with patient at bedside to assess for discharge needs.  Patient lives at home and is independent with all needs. He does not use any medical equipment or have any home health needs.  Patient does not have any discharge needs at this time.  CM provided a transitional care folder, information on advanced directives, information on pharmacy bedside delivery, and discharge planning begins on admission with contact information for any needs/questions.     D/C Plan: Home  PCP: Dr. Enoc Frost  Preferred Pharmacy: [id number]  Discharge transportation: Family  My [facility]: Declines  Pharmacy Bedside Delivery: Yes     06/04/19 1345   Discharge Assessment   Assessment Type Discharge Planning Assessment   Confirmed/corrected address and phone number on facesheet? Yes   Assessment information obtained from? Patient;Medical Record   Expected Length of Stay (days)   (TBD)   Communicated expected length of stay with patient/caregiver yes   Prior to hospitilization cognitive status: Alert/Oriented   Prior to hospitalization functional status: Independent   Current cognitive status: Alert/Oriented   Current Functional Status: Independent   Facility Arrived From: N/A   Lives With alone   Able to Return to Prior Arrangements yes   Is patient able to care for self after discharge? Yes   Who are your caregiver(s) and their phone number(s)? Luma Diaz, [phone] & Dasha Rincon, [phone]   Patient's perception of discharge disposition admitted as an inpatient   Readmission Within the Last 30 Days no previous admission in last 30 days   Patient currently being followed by outpatient case management? No   Patient currently receives any other outside agency services? No   Equipment Currently Used at Home none   Do you have any problems affording any of your prescribed medications? No   Is the patient taking medications as prescribed? yes   Does the patient have transportation home? Yes    Transportation Anticipated car, drives self   Dialysis Name and Scheduled days N/A   Does the patient receive services at the [facility]? No   Discharge Plan A Home   DME Needed Upon Discharge  none   Patient/Family in Agreement with Plan yes

## 2019-06-04 NOTE — SUBJECTIVE & OBJECTIVE
Interval History: Pt with no significant complaints except that he experience severe chills upon arrival. Explained findings to patient and significant other. Denies any dental complaints, recent dental work, upper respiratory symptoms. WBC trending down.     Review of Systems   Constitutional: Positive for chills and fever. Negative for activity change, appetite change, fatigue and unexpected weight change.   HENT: Negative for congestion, facial swelling, rhinorrhea, sinus pressure, sneezing and sore throat.    Eyes: Negative for discharge, redness and visual disturbance.   Respiratory: Positive for cough and shortness of breath. Negative for apnea, chest tightness, wheezing and stridor.    Cardiovascular: Negative for chest pain, palpitations and leg swelling.   Gastrointestinal: Positive for nausea. Negative for abdominal distention, abdominal pain, anal bleeding, blood in stool, constipation, diarrhea and vomiting.   Genitourinary: Negative for difficulty urinating, discharge, dysuria, frequency and hematuria.   Musculoskeletal: Negative for arthralgias, back pain, gait problem, joint swelling, myalgias, neck pain and neck stiffness.   Skin: Negative for color change and pallor.   Neurological: Negative for dizziness, tremors, seizures, syncope, facial asymmetry, speech difficulty, weakness, light-headedness, numbness and headaches.   Psychiatric/Behavioral: Negative for behavioral problems, confusion, hallucinations and suicidal ideas. The patient is not nervous/anxious.    All other systems reviewed and are negative.    Objective:     Vital Signs (Most Recent):  Temp: 98.4 °F (36.9 °C) (06/04/19 1224)  Pulse: 63 (06/04/19 1224)  Resp: 18 (06/04/19 1224)  BP: (!) 169/86 (06/04/19 1408)  SpO2: 97 % (06/04/19 1224) Vital Signs (24h Range):  Temp:  [97.3 °F (36.3 °C)-98.4 °F (36.9 °C)] 98.4 °F (36.9 °C)  Pulse:  [54-65] 63  Resp:  [12-20] 18  SpO2:  [96 %-100 %] 97 %  BP: (126-193)/(72-93) 169/86     Weight: 93.6  kg (206 lb 5.6 oz)  Body mass index is 30.47 kg/m².    Intake/Output Summary (Last 24 hours) at 6/4/2019 1601  Last data filed at 6/4/2019 1600  Gross per 24 hour   Intake 1020 ml   Output --   Net 1020 ml      Physical Exam   Constitutional: He is oriented to person, place, and time. He appears well-developed and well-nourished.   HENT:   Head: Normocephalic and atraumatic.   Eyes: Pupils are equal, round, and reactive to light. Conjunctivae are normal.   Neck: Normal range of motion. Neck supple.   Cardiovascular: Normal rate, regular rhythm and normal heart sounds.   No murmur heard.  Pulmonary/Chest: Effort normal. No respiratory distress. He has decreased breath sounds.   Abdominal: Soft. Bowel sounds are normal. He exhibits no distension. There is no tenderness.   Musculoskeletal: He exhibits no edema, tenderness or deformity.   Neurological: He is alert and oriented to person, place, and time.   Skin: Skin is warm and dry. No rash noted. No erythema.   Psychiatric: He has a normal mood and affect. His behavior is normal.   Nursing note and vitals reviewed.      Significant Labs:   CBC:   Recent Labs   Lab 06/03/19  0750 06/03/19  1438 06/04/19  0621   WBC 20.39* 21.52* 14.20*   HGB 11.9* 11.6* 11.7*   HCT 36.9* 35.3* 35.9*   * 113* 82*     CMP:   Recent Labs   Lab 06/02/19  1658 06/03/19  0750 06/03/19  1439 06/04/19  0621    137 137 132*   K 4.6 4.5 5.2* 4.7    105 109 101   CO2 26 26 17* 22*   * 118* 131* 219*   BUN 30* 25* 29* 33*   CREATININE 1.9* 1.6* 1.6* 1.8*   CALCIUM 9.7 8.9 8.6* 8.6*   PROT 7.0  --   --   --    ALBUMIN 3.7 3.0*  --   --    BILITOT 0.6  --   --   --    ALKPHOS 73  --   --   --    AST 18  --   --   --    ALT 25  --   --   --    ANIONGAP 11 6* 11 9   EGFRNONAA 31* 38* 38* 33*     All pertinent labs within the past 24 hours have been reviewed.    Significant Imaging: I have reviewed all pertinent imaging results/findings within the past 24 hours.

## 2019-06-05 LAB
ALBUMIN SERPL BCP-MCNC: 3 G/DL (ref 3.5–5.2)
ALP SERPL-CCNC: 74 U/L (ref 55–135)
ALT SERPL W/O P-5'-P-CCNC: 13 U/L (ref 10–44)
ANION GAP SERPL CALC-SCNC: 8 MMOL/L (ref 8–16)
AST SERPL-CCNC: 16 U/L (ref 10–40)
BACTERIA BLD CULT: NORMAL
BASOPHILS # BLD AUTO: 0.01 K/UL (ref 0–0.2)
BASOPHILS NFR BLD: 0.1 % (ref 0–1.9)
BILIRUB SERPL-MCNC: 0.3 MG/DL (ref 0.1–1)
BUN SERPL-MCNC: 30 MG/DL (ref 8–23)
CALCIUM SERPL-MCNC: 9.2 MG/DL (ref 8.7–10.5)
CHLORIDE SERPL-SCNC: 107 MMOL/L (ref 95–110)
CO2 SERPL-SCNC: 26 MMOL/L (ref 23–29)
CREAT SERPL-MCNC: 1.6 MG/DL (ref 0.5–1.4)
DIFFERENTIAL METHOD: ABNORMAL
EOSINOPHIL # BLD AUTO: 0.3 K/UL (ref 0–0.5)
EOSINOPHIL NFR BLD: 3.5 % (ref 0–8)
ERYTHROCYTE [DISTWIDTH] IN BLOOD BY AUTOMATED COUNT: 16 % (ref 11.5–14.5)
EST. GFR  (AFRICAN AMERICAN): 44 ML/MIN/1.73 M^2
EST. GFR  (NON AFRICAN AMERICAN): 38 ML/MIN/1.73 M^2
GLUCOSE SERPL-MCNC: 76 MG/DL (ref 70–110)
HCT VFR BLD AUTO: 39 % (ref 40–54)
HGB BLD-MCNC: 12.6 G/DL (ref 14–18)
L PNEUMO AG UR QL IA: NOT DETECTED
LYMPHOCYTES # BLD AUTO: 1 K/UL (ref 1–4.8)
LYMPHOCYTES NFR BLD: 11.7 % (ref 18–48)
MAGNESIUM SERPL-MCNC: 2 MG/DL (ref 1.6–2.6)
MCH RBC QN AUTO: 30.1 PG (ref 27–31)
MCHC RBC AUTO-ENTMCNC: 32.3 G/DL (ref 32–36)
MCV RBC AUTO: 93 FL (ref 82–98)
MONOCYTES # BLD AUTO: 0.8 K/UL (ref 0.3–1)
MONOCYTES NFR BLD: 8.8 % (ref 4–15)
NEUTROPHILS # BLD AUTO: 6.7 K/UL (ref 1.8–7.7)
NEUTROPHILS NFR BLD: 75.9 % (ref 38–73)
PHOSPHATE SERPL-MCNC: 3.4 MG/DL (ref 2.7–4.5)
PLATELET # BLD AUTO: 139 K/UL (ref 150–350)
PMV BLD AUTO: 12.8 FL (ref 9.2–12.9)
POTASSIUM SERPL-SCNC: 4.2 MMOL/L (ref 3.5–5.1)
PROT SERPL-MCNC: 6.2 G/DL (ref 6–8.4)
RBC # BLD AUTO: 4.19 M/UL (ref 4.6–6.2)
SODIUM SERPL-SCNC: 141 MMOL/L (ref 136–145)
WBC # BLD AUTO: 8.88 K/UL (ref 3.9–12.7)

## 2019-06-05 PROCEDURE — 36415 COLL VENOUS BLD VENIPUNCTURE: CPT

## 2019-06-05 PROCEDURE — 94640 AIRWAY INHALATION TREATMENT: CPT

## 2019-06-05 PROCEDURE — 25000003 PHARM REV CODE 250: Performed by: NURSE PRACTITIONER

## 2019-06-05 PROCEDURE — 84100 ASSAY OF PHOSPHORUS: CPT

## 2019-06-05 PROCEDURE — 25000003 PHARM REV CODE 250: Performed by: HOSPITALIST

## 2019-06-05 PROCEDURE — 63600175 PHARM REV CODE 636 W HCPCS: Performed by: NURSE PRACTITIONER

## 2019-06-05 PROCEDURE — 99900035 HC TECH TIME PER 15 MIN (STAT)

## 2019-06-05 PROCEDURE — 94799 UNLISTED PULMONARY SVC/PX: CPT

## 2019-06-05 PROCEDURE — S0028 INJECTION, FAMOTIDINE, 20 MG: HCPCS | Performed by: HOSPITALIST

## 2019-06-05 PROCEDURE — 85025 COMPLETE CBC W/AUTO DIFF WBC: CPT

## 2019-06-05 PROCEDURE — 11000001 HC ACUTE MED/SURG PRIVATE ROOM

## 2019-06-05 PROCEDURE — 80053 COMPREHEN METABOLIC PANEL: CPT

## 2019-06-05 PROCEDURE — 25000242 PHARM REV CODE 250 ALT 637 W/ HCPCS: Performed by: HOSPITALIST

## 2019-06-05 PROCEDURE — 83735 ASSAY OF MAGNESIUM: CPT

## 2019-06-05 RX ORDER — FUROSEMIDE 20 MG/1
20 TABLET ORAL DAILY
Status: DISCONTINUED | OUTPATIENT
Start: 2019-06-05 | End: 2019-06-06 | Stop reason: HOSPADM

## 2019-06-05 RX ADMIN — BUDESONIDE 0.5 MG: 0.5 SUSPENSION RESPIRATORY (INHALATION) at 07:06

## 2019-06-05 RX ADMIN — HYDRALAZINE HYDROCHLORIDE 25 MG: 25 TABLET, FILM COATED ORAL at 05:06

## 2019-06-05 RX ADMIN — METOPROLOL SUCCINATE 25 MG: 25 TABLET, EXTENDED RELEASE ORAL at 08:06

## 2019-06-05 RX ADMIN — CEFTRIAXONE 1 G: 1 INJECTION, SOLUTION INTRAVENOUS at 09:06

## 2019-06-05 RX ADMIN — FAMOTIDINE 20 MG: 20 INJECTION, SOLUTION INTRAVENOUS at 08:06

## 2019-06-05 RX ADMIN — FUROSEMIDE 20 MG: 20 TABLET ORAL at 07:06

## 2019-06-05 RX ADMIN — BUDESONIDE 0.5 MG: 0.5 SUSPENSION RESPIRATORY (INHALATION) at 08:06

## 2019-06-05 RX ADMIN — FINASTERIDE 5 MG: 5 TABLET, FILM COATED ORAL at 08:06

## 2019-06-05 RX ADMIN — ASPIRIN 81 MG: 81 TABLET, COATED ORAL at 08:06

## 2019-06-05 RX ADMIN — LOSARTAN POTASSIUM 50 MG: 50 TABLET, FILM COATED ORAL at 08:06

## 2019-06-05 RX ADMIN — ARFORMOTEROL TARTRATE 15 MCG: 15 SOLUTION RESPIRATORY (INHALATION) at 08:06

## 2019-06-05 RX ADMIN — ATORVASTATIN CALCIUM 10 MG: 10 TABLET, FILM COATED ORAL at 08:06

## 2019-06-05 RX ADMIN — ARFORMOTEROL TARTRATE 15 MCG: 15 SOLUTION RESPIRATORY (INHALATION) at 07:06

## 2019-06-05 NOTE — SUBJECTIVE & OBJECTIVE
Interval History:  Pt denies complaints. Spoke about plan of care with daughter.     Review of Systems   Constitutional: Positive for chills and fever. Negative for activity change, appetite change, fatigue and unexpected weight change.   HENT: Negative for congestion, facial swelling, rhinorrhea, sinus pressure, sneezing and sore throat.    Eyes: Negative for discharge, redness and visual disturbance.   Respiratory: Positive for cough and shortness of breath. Negative for apnea, chest tightness, wheezing and stridor.    Cardiovascular: Negative for chest pain, palpitations and leg swelling.   Gastrointestinal: Positive for nausea. Negative for abdominal distention, abdominal pain, anal bleeding, blood in stool, constipation, diarrhea and vomiting.   Genitourinary: Negative for difficulty urinating, discharge, dysuria, frequency and hematuria.   Musculoskeletal: Negative for arthralgias, back pain, gait problem, joint swelling, myalgias, neck pain and neck stiffness.   Skin: Negative for color change and pallor.   Neurological: Negative for dizziness, tremors, seizures, syncope, facial asymmetry, speech difficulty, weakness, light-headedness, numbness and headaches.   Psychiatric/Behavioral: Negative for behavioral problems, confusion, hallucinations and suicidal ideas. The patient is not nervous/anxious.    All other systems reviewed and are negative.    Objective:     Vital Signs (Most Recent):  Temp: 98.6 °F (37 °C) (06/05/19 1237)  Pulse: 73 (06/05/19 1237)  Resp: 18 (06/05/19 1237)  BP: (!) 165/77 (06/05/19 1237)  SpO2: 99 % (06/05/19 1237) Vital Signs (24h Range):  Temp:  [97.5 °F (36.4 °C)-98.6 °F (37 °C)] 98.6 °F (37 °C)  Pulse:  [60-75] 73  Resp:  [14-18] 18  SpO2:  [96 %-100 %] 99 %  BP: (149-177)/(74-87) 165/77     Weight: 93.6 kg (206 lb 5.6 oz)  Body mass index is 30.47 kg/m².    Intake/Output Summary (Last 24 hours) at 6/5/2019 1631  Last data filed at 6/5/2019 1441  Gross per 24 hour   Intake 520 ml    Output --   Net 520 ml      Physical Exam   Constitutional: He is oriented to person, place, and time. He appears well-developed and well-nourished.   HENT:   Head: Normocephalic and atraumatic.   Eyes: Pupils are equal, round, and reactive to light. Conjunctivae are normal.   Neck: Normal range of motion. Neck supple.   Cardiovascular: Normal rate, regular rhythm and normal heart sounds.   No murmur heard.  Pulmonary/Chest: Effort normal. No respiratory distress. He has decreased breath sounds.   Abdominal: Soft. Bowel sounds are normal. He exhibits no distension. There is no tenderness.   obese   Musculoskeletal: He exhibits no edema, tenderness or deformity.   Neurological: He is alert and oriented to person, place, and time.   Skin: Skin is warm and dry. No rash noted. No erythema.   Psychiatric: He has a normal mood and affect. His behavior is normal.   Nursing note and vitals reviewed.      Significant Labs:   CBC:   Recent Labs   Lab 06/04/19 0621 06/05/19  0514   WBC 14.20* 8.88   HGB 11.7* 12.6*   HCT 35.9* 39.0*   PLT 82* 139*     CMP:   Recent Labs   Lab 06/04/19 0621 06/05/19  0514   * 141   K 4.7 4.2    107   CO2 22* 26   * 76   BUN 33* 30*   CREATININE 1.8* 1.6*   CALCIUM 8.6* 9.2   PROT  --  6.2   ALBUMIN  --  3.0*   BILITOT  --  0.3   ALKPHOS  --  74   AST  --  16   ALT  --  13   ANIONGAP 9 8   EGFRNONAA 33* 38*     All pertinent labs within the past 24 hours have been reviewed.    Significant Imaging: I have reviewed all pertinent imaging results/findings within the past 24 hours.

## 2019-06-05 NOTE — PROGRESS NOTES
[facility] Hospital Medicine  Progress Note    Patient Name: Thai Rincon  MRN: [id number]  Patient Class: IP- Inpatient   Admission Date: 6/2/2019  Length of Stay: 2 days  Attending Physician: Adrián Doe MD  Primary Care Provider: Enoc Frost MD        Subjective:     Principal Problem:Community acquired pneumonia    HPI:  Thai Rincon is a 88 y.o. male patient with PMHx of BPH and HTN who presented to the ER for SOB which  began at  around 1:30pm. Associated sxs include fatigue.  The patient denies any modifying factors. Daughter notes patient has been having nausea, fever, chills, and urinary frequency since the drive back from [place]. Patient denies CP, cough, gonzalez, pnd, orthopnea, palpitations, diaphoresis, headache, blurred vision, numbness, tingling, dizziness, localized weakness, abdominal pain, blood in stools, melena, hematemesis,  urgency, dysuria or hematuria. In the ER the patient was found to have a WBC of 13K. CXR showed discoid atelectasis of the right mid lung. In the ER the patient was found to have a low fever of 100.6 F.         Hospital Course:  6/3/19 No further fever today. WBC trended up to 21K. Continue current management with IV ABX. Will continue to monitor. Blood cx positive gram positive cocci in chains resembling Strep - proved to be Strep Agalactiae.  ABX changed to IV Vanc. Will repeat blood cx in am, but pt difficult blood draw - one set drawn.  Infectious Ds consult pending, results of 2 D ECHO pending are negative for vegetation. IV Vanco discontinued and Rocephin continued     Interval History:  Pt denies complaints. Spoke about plan of care with daughter.     Review of Systems   Constitutional: Positive for chills and fever. Negative for activity change, appetite change, fatigue and unexpected weight change.   HENT: Negative for congestion, facial swelling, rhinorrhea, sinus pressure, sneezing and sore throat.    Eyes: Negative for discharge, redness  and visual disturbance.   Respiratory: Positive for cough and shortness of breath. Negative for apnea, chest tightness, wheezing and stridor.    Cardiovascular: Negative for chest pain, palpitations and leg swelling.   Gastrointestinal: Positive for nausea. Negative for abdominal distention, abdominal pain, anal bleeding, blood in stool, constipation, diarrhea and vomiting.   Genitourinary: Negative for difficulty urinating, discharge, dysuria, frequency and hematuria.   Musculoskeletal: Negative for arthralgias, back pain, gait problem, joint swelling, myalgias, neck pain and neck stiffness.   Skin: Negative for color change and pallor.   Neurological: Negative for dizziness, tremors, seizures, syncope, facial asymmetry, speech difficulty, weakness, light-headedness, numbness and headaches.   Psychiatric/Behavioral: Negative for behavioral problems, confusion, hallucinations and suicidal ideas. The patient is not nervous/anxious.    All other systems reviewed and are negative.    Objective:     Vital Signs (Most Recent):  Temp: 98.6 °F (37 °C) (06/05/19 1237)  Pulse: 73 (06/05/19 1237)  Resp: 18 (06/05/19 1237)  BP: (!) 165/77 (06/05/19 1237)  SpO2: 99 % (06/05/19 1237) Vital Signs (24h Range):  Temp:  [97.5 °F (36.4 °C)-98.6 °F (37 °C)] 98.6 °F (37 °C)  Pulse:  [60-75] 73  Resp:  [14-18] 18  SpO2:  [96 %-100 %] 99 %  BP: (149-177)/(74-87) 165/77     Weight: 93.6 kg (206 lb 5.6 oz)  Body mass index is 30.47 kg/m².    Intake/Output Summary (Last 24 hours) at 6/5/2019 1631  Last data filed at 6/5/2019 1441  Gross per 24 hour   Intake 520 ml   Output --   Net 520 ml      Physical Exam   Constitutional: He is oriented to person, place, and time. He appears well-developed and well-nourished.   HENT:   Head: Normocephalic and atraumatic.   Eyes: Pupils are equal, round, and reactive to light. Conjunctivae are normal.   Neck: Normal range of motion. Neck supple.   Cardiovascular: Normal rate, regular rhythm and normal  heart sounds.   No murmur heard.  Pulmonary/Chest: Effort normal. No respiratory distress. He has decreased breath sounds.   Abdominal: Soft. Bowel sounds are normal. He exhibits no distension. There is no tenderness.   obese   Musculoskeletal: He exhibits no edema, tenderness or deformity.   Neurological: He is alert and oriented to person, place, and time.   Skin: Skin is warm and dry. No rash noted. No erythema.   Psychiatric: He has a normal mood and affect. His behavior is normal.   Nursing note and vitals reviewed.      Significant Labs:   CBC:   Recent Labs   Lab 06/04/19 0621 06/05/19 0514   WBC 14.20* 8.88   HGB 11.7* 12.6*   HCT 35.9* 39.0*   PLT 82* 139*     CMP:   Recent Labs   Lab 06/04/19 0621 06/05/19 0514   * 141   K 4.7 4.2    107   CO2 22* 26   * 76   BUN 33* 30*   CREATININE 1.8* 1.6*   CALCIUM 8.6* 9.2   PROT  --  6.2   ALBUMIN  --  3.0*   BILITOT  --  0.3   ALKPHOS  --  74   AST  --  16   ALT  --  13   ANIONGAP 9 8   EGFRNONAA 33* 38*     All pertinent labs within the past 24 hours have been reviewed.    Significant Imaging: I have reviewed all pertinent imaging results/findings within the past 24 hours.    Assessment/Plan:      * Community acquired pneumonia  - CXR showed discoid atelectasis right mid lung  - start CTX/zithromax empirically   - duonebs q6h prn  - Incentive spirometer  - blood cx - Strep Agalactaie - Infectious Ds consult pending      Streptococcus agalactiae infection-bacteremia   Blood cx positive gram positive cocci in chains resembling Strep. ABX changed to IV Vanc.   Repeat blood cultures drawn 6/4  Infectious Ds consult pending  2 d ECHO results negative for vegetation  IV Rocephin - Vanco discontinued  Repeat blood culture preliminary - no growth        Sepsis  Resolved  Has bacteremia  Vital signs stable      Benign essential HTN  - Monitor VS - blood pressure fluctuating  - Resume home meds - losartan and metoprolol      Anemia of chronic  illness  - Monitor and transfuse if symptomatic  - CBC in am       Acute kidney injury superimposed on chronic kidney disease  - unclear baseline Cr from chart  - DAILY due to dehydration/IVVD  - 30ml/kg IVF bolus given in ER  - continue maintenance fluids NS @ 125cc/hr  6/3/19 continue to monitor renal function. Continue IV fluids.   6/4 - likely has degree of CKD (1.9, 1.6, 1.8)  6/5 - remains stable      VTE Risk Mitigation (From admission, onward)        Ordered     IP VTE HIGH RISK PATIENT  Once      06/02/19 1933     Place sequential compression device  Until discontinued      06/02/19 1933              Delma French NP  Department of Hospital Medicine   [facility]

## 2019-06-05 NOTE — PLAN OF CARE
Problem: Fall Injury Risk  Goal: Absence of Fall and Fall-Related Injury  Outcome: Ongoing (interventions implemented as appropriate)  Intervention: Identify and Manage Contributors to Fall Injury Risk  Free from injury. Pain controlled. Aseptic technique maintained. Ambulatory. Cardiac diet. No distress noted. Chart check completed

## 2019-06-05 NOTE — NURSING
Inquired with hospital medicine in regards to  IVF order. Dr. Armas states as long as patient is eating no IVF are needed at this time.

## 2019-06-05 NOTE — ASSESSMENT & PLAN NOTE
Blood cx positive gram positive cocci in chains resembling Strep. ABX changed to IV Vanc.   Repeat blood cultures drawn 6/4  Infectious Ds consult pending  2 d ECHO results negative for vegetation  IV Rocephin - Vanco discontinued  Repeat blood culture preliminary - no growth

## 2019-06-05 NOTE — NURSING
Secure chat send to Beto Brito NP. Informed /80 after PRN dose of hydralazine was given at 1640. Alisha states to administer another dose of medication and recheck bp.

## 2019-06-05 NOTE — PLAN OF CARE
Problem: Adult Inpatient Plan of Care  Goal: Plan of Care Review  Outcome: Ongoing (interventions implemented as appropriate)  Plan of care and medication reviewed with patient. Verbalized understanding using teach back method. BP monitored. PRN HTN medicating administered per MAR. Fall precautions in place. Patient remained free from falls and/or injuries. Bed low and locked. Side rails up x2. Personal items and call light within reach. Chart Check complete

## 2019-06-05 NOTE — ASSESSMENT & PLAN NOTE
- unclear baseline Cr from chart  - DAILY due to dehydration/IVVD  - 30ml/kg IVF bolus given in ER  - continue maintenance fluids NS @ 125cc/hr  6/3/19 continue to monitor renal function. Continue IV fluids.   6/4 - likely has degree of CKD (1.9, 1.6, 1.8)  6/5 - remains stable

## 2019-06-05 NOTE — NURSING
Secure chat sent to hospital medicine informing repeat BP is 174/84 following the administration of PRN hydralazine    2224: JURGEN Brito states to recheck BP at midnight and if SBP is >170 to administer new PRN hydralazine order.

## 2019-06-06 VITALS
HEIGHT: 69 IN | TEMPERATURE: 98 F | OXYGEN SATURATION: 96 % | SYSTOLIC BLOOD PRESSURE: 137 MMHG | HEART RATE: 79 BPM | RESPIRATION RATE: 16 BRPM | BODY MASS INDEX: 30.57 KG/M2 | WEIGHT: 206.38 LBS | DIASTOLIC BLOOD PRESSURE: 78 MMHG

## 2019-06-06 PROBLEM — A41.9 SEPSIS: Status: RESOLVED | Noted: 2019-06-03 | Resolved: 2019-06-06

## 2019-06-06 LAB
ALBUMIN SERPL BCP-MCNC: 3.2 G/DL (ref 3.5–5.2)
ALP SERPL-CCNC: 64 U/L (ref 55–135)
ALT SERPL W/O P-5'-P-CCNC: 18 U/L (ref 10–44)
ANION GAP SERPL CALC-SCNC: 10 MMOL/L (ref 8–16)
AST SERPL-CCNC: 18 U/L (ref 10–40)
BASOPHILS # BLD AUTO: 0.01 K/UL (ref 0–0.2)
BASOPHILS NFR BLD: 0.2 % (ref 0–1.9)
BILIRUB SERPL-MCNC: 0.4 MG/DL (ref 0.1–1)
BUN SERPL-MCNC: 27 MG/DL (ref 8–23)
CALCIUM SERPL-MCNC: 9.6 MG/DL (ref 8.7–10.5)
CHLORIDE SERPL-SCNC: 105 MMOL/L (ref 95–110)
CO2 SERPL-SCNC: 28 MMOL/L (ref 23–29)
CREAT SERPL-MCNC: 1.7 MG/DL (ref 0.5–1.4)
DIFFERENTIAL METHOD: ABNORMAL
EOSINOPHIL # BLD AUTO: 0.5 K/UL (ref 0–0.5)
EOSINOPHIL NFR BLD: 7.2 % (ref 0–8)
ERYTHROCYTE [DISTWIDTH] IN BLOOD BY AUTOMATED COUNT: 15.6 % (ref 11.5–14.5)
EST. GFR  (AFRICAN AMERICAN): 41 ML/MIN/1.73 M^2
EST. GFR  (NON AFRICAN AMERICAN): 35 ML/MIN/1.73 M^2
GLUCOSE SERPL-MCNC: 79 MG/DL (ref 70–110)
HCT VFR BLD AUTO: 40.3 % (ref 40–54)
HGB BLD-MCNC: 13.1 G/DL (ref 14–18)
LYMPHOCYTES # BLD AUTO: 1.2 K/UL (ref 1–4.8)
LYMPHOCYTES NFR BLD: 18.5 % (ref 18–48)
MCH RBC QN AUTO: 30.3 PG (ref 27–31)
MCHC RBC AUTO-ENTMCNC: 32.5 G/DL (ref 32–36)
MCV RBC AUTO: 93 FL (ref 82–98)
MONOCYTES # BLD AUTO: 0.4 K/UL (ref 0.3–1)
MONOCYTES NFR BLD: 6.1 % (ref 4–15)
NEUTROPHILS # BLD AUTO: 4.4 K/UL (ref 1.8–7.7)
NEUTROPHILS NFR BLD: 68 % (ref 38–73)
PLATELET # BLD AUTO: 180 K/UL (ref 150–350)
PMV BLD AUTO: 13.2 FL (ref 9.2–12.9)
POTASSIUM SERPL-SCNC: 4.2 MMOL/L (ref 3.5–5.1)
PROT SERPL-MCNC: 6.5 G/DL (ref 6–8.4)
RBC # BLD AUTO: 4.32 M/UL (ref 4.6–6.2)
SODIUM SERPL-SCNC: 143 MMOL/L (ref 136–145)
WBC # BLD AUTO: 6.42 K/UL (ref 3.9–12.7)

## 2019-06-06 PROCEDURE — 80053 COMPREHEN METABOLIC PANEL: CPT

## 2019-06-06 PROCEDURE — 25000003 PHARM REV CODE 250: Performed by: NURSE PRACTITIONER

## 2019-06-06 PROCEDURE — 36415 COLL VENOUS BLD VENIPUNCTURE: CPT

## 2019-06-06 PROCEDURE — 63600175 PHARM REV CODE 636 W HCPCS: Performed by: NURSE PRACTITIONER

## 2019-06-06 PROCEDURE — 94640 AIRWAY INHALATION TREATMENT: CPT

## 2019-06-06 PROCEDURE — 99900035 HC TECH TIME PER 15 MIN (STAT)

## 2019-06-06 PROCEDURE — 25000003 PHARM REV CODE 250: Performed by: HOSPITALIST

## 2019-06-06 PROCEDURE — 25000242 PHARM REV CODE 250 ALT 637 W/ HCPCS: Performed by: HOSPITALIST

## 2019-06-06 PROCEDURE — S0028 INJECTION, FAMOTIDINE, 20 MG: HCPCS | Performed by: HOSPITALIST

## 2019-06-06 PROCEDURE — 94799 UNLISTED PULMONARY SVC/PX: CPT

## 2019-06-06 PROCEDURE — 85025 COMPLETE CBC W/AUTO DIFF WBC: CPT

## 2019-06-06 RX ADMIN — FINASTERIDE 5 MG: 5 TABLET, FILM COATED ORAL at 09:06

## 2019-06-06 RX ADMIN — ARFORMOTEROL TARTRATE 15 MCG: 15 SOLUTION RESPIRATORY (INHALATION) at 07:06

## 2019-06-06 RX ADMIN — LOSARTAN POTASSIUM 50 MG: 50 TABLET, FILM COATED ORAL at 09:06

## 2019-06-06 RX ADMIN — FAMOTIDINE 20 MG: 20 INJECTION, SOLUTION INTRAVENOUS at 09:06

## 2019-06-06 RX ADMIN — ATORVASTATIN CALCIUM 10 MG: 10 TABLET, FILM COATED ORAL at 09:06

## 2019-06-06 RX ADMIN — CEFTRIAXONE 1 G: 1 INJECTION, SOLUTION INTRAVENOUS at 09:06

## 2019-06-06 RX ADMIN — ASPIRIN 81 MG: 81 TABLET, COATED ORAL at 09:06

## 2019-06-06 RX ADMIN — BUDESONIDE 0.5 MG: 0.5 SUSPENSION RESPIRATORY (INHALATION) at 07:06

## 2019-06-06 RX ADMIN — FUROSEMIDE 20 MG: 20 TABLET ORAL at 09:06

## 2019-06-06 RX ADMIN — METOPROLOL SUCCINATE 25 MG: 25 TABLET, EXTENDED RELEASE ORAL at 09:06

## 2019-06-06 NOTE — ASSESSMENT & PLAN NOTE
06/05- cardiac echo is negative, might need [place], will pan to complete 2 weeks of IV Rocephin , will need close follow up

## 2019-06-06 NOTE — CONSULTS
[facility]  Infectious Disease  Consult Note    Patient Name: Thai Rincon  MRN: [id number]  Admission Date: 6/2/2019  Hospital Length of Stay: 3 days  Attending Physician: Adrián Doe MD  Primary Care Provider: Enoc Frost MD     Isolation Status: No active isolations    Patient information was obtained from patient, past medical records and ER records.      Consults  Assessment/Plan:     Streptococcus agalactiae infection-bacteremia  06/05- cardiac echo is negative, might need [place], will pan to complete 2 weeks of IV Rocephin , will need close follow up     Acute kidney injury superimposed on chronic kidney disease  06.05- will avoid nephrotoxic  meds , monitor closely         Thank you for your consult. I will follow-up with patient. Please contact us if you have any additional questions.    Yunior Joe MD  Infectious Disease  [facility]    Subjective:     Principal Problem: Community acquired pneumonia    HPI: 88 y.o. male patient with PMHx of BPH and HTN who presented to the ER for SOB . In the ER the patient was found to have a WBC of 13K. CXR showed discoid atelectasis of the right mid lung. In the ER the patient was found to have a low fever of 100.6 F.   Since admission, blood cultures are now positive for strep agalactiae .  Cardiac echo did not show any evidence of vegetation.      Past Medical History:   Diagnosis Date    BPH (benign prostatic hyperplasia)     Glaucoma     Hypertension     Kidney stones        Past Surgical History:   Procedure Laterality Date    HERNIA REPAIR Right 2010       Review of patient's allergies indicates:  No Known Allergies    Medications:  Medications Prior to Admission   Medication Sig    albuterol-ipratropium  mcg (COMBIVENT)  mcg/actuation inhaler Inhale 2 puffs into the lungs every 6 (six) hours as needed for Wheezing.    aspirin 81 MG Chew Take 81 mg by mouth once daily.    atorvastatin (LIPITOR) 10 MG  tablet Take 10 mg by mouth once daily.    finasteride (PROSCAR) 5 mg tablet Take 5 mg by mouth once daily.    furosemide (LASIX) 20 MG tablet Take 20 mg by mouth once daily.    losartan (COZAAR) 50 MG tablet Take 50 mg by mouth once daily.    metoprolol succinate (TOPROL-XL) 25 MG 24 hr tablet Take 25 mg by mouth once daily.    acetaminophen (TYLENOL) 500 MG tablet Take 500 mg by mouth every 6 (six) hours as needed for Pain.    albuterol (VENTOLIN HFA) 90 mcg/actuation inhaler Inhale 2 puffs into the lungs every 6 (six) hours as needed for Wheezing. Rescue    fluticasone (FLONASE) 50 mcg/actuation nasal spray 1 spray by Each Nare route once daily.    loratadine (CLARITIN) 10 mg tablet Take 10 mg by mouth daily as needed for Allergies.     Antibiotics (From admission, onward)    Start     Stop Route Frequency Ordered    06/04/19 0945  cefTRIAXone (ROCEPHIN) 1 g in dextrose 5 % 50 mL IVPB      -- IV Every 24 hours (non-standard times) 06/04/19 0844        Antifungals (From admission, onward)    None        Antivirals (From admission, onward)    None             There is no immunization history on file for this patient.    Family History     None        Social History     Socioeconomic History    Marital status:      Spouse name: Not on file    Number of children: Not on file    Years of education: Not on file    Highest education level: Not on file   Occupational History    Not on file   Social Needs    Financial resource strain: Not on file    Food insecurity:     Worry: Not on file     Inability: Not on file    Transportation needs:     Medical: Not on file     Non-medical: Not on file   Tobacco Use    Smoking status: Never Smoker    Smokeless tobacco: Never Used   Substance and Sexual Activity    Alcohol use: No     Alcohol/week: 0.0 oz    Drug use: No    Sexual activity: Not Currently   Lifestyle    Physical activity:     Days per week: Not on file     Minutes per session: Not on file     Stress: Not on file   Relationships    Social connections:     Talks on phone: Not on file     Gets together: Not on file     Attends Confucianist service: Not on file     Active member of club or organization: Not on file     Attends meetings of clubs or organizations: Not on file     Relationship status: Not on file   Other Topics Concern    Not on file   Social History Narrative    Not on file     Review of Systems   Constitutional: Positive for chills and fever. Negative for activity change, appetite change, fatigue and unexpected weight change.   HENT: Negative for congestion, facial swelling, rhinorrhea, sinus pressure, sneezing and sore throat.    Eyes: Negative for discharge, redness and visual disturbance.   Respiratory: Positive for cough and shortness of breath. Negative for apnea, chest tightness, wheezing and stridor.    Cardiovascular: Negative for chest pain, palpitations and leg swelling.   Gastrointestinal: Positive for nausea. Negative for abdominal distention, abdominal pain, anal bleeding, blood in stool, constipation, diarrhea and vomiting.   Genitourinary: Negative for difficulty urinating, discharge, dysuria, frequency and hematuria.   Musculoskeletal: Negative for arthralgias, back pain, gait problem, joint swelling, myalgias, neck pain and neck stiffness.   Skin: Negative for color change and pallor.   Neurological: Negative for dizziness, tremors, seizures, syncope, facial asymmetry, speech difficulty, weakness, light-headedness, numbness and headaches.   Psychiatric/Behavioral: Negative for behavioral problems, confusion, hallucinations and suicidal ideas. The patient is not nervous/anxious.    All other systems reviewed and are negative.    Objective:     Vital Signs (Most Recent):  Temp: 97.5 °F (36.4 °C) (06/06/19 0746)  Pulse: 79 (06/06/19 0746)  Resp: 16 (06/06/19 0746)  BP: 137/78 (06/06/19 0746)  SpO2: 96 % (06/06/19 0746) Vital Signs (24h Range):  Temp:  [97.4 °F (36.3 °C)-98.7  °F (37.1 °C)] 97.5 °F (36.4 °C)  Pulse:  [65-79] 79  Resp:  [14-18] 16  SpO2:  [94 %-100 %] 96 %  BP: (137-185)/() 137/78     Weight: 93.6 kg (206 lb 5.6 oz)  Body mass index is 30.47 kg/m².    Estimated Creatinine Clearance: 33.9 mL/min (A) (based on SCr of 1.7 mg/dL (H)).    Physical Exam   Constitutional: He is oriented to person, place, and time. He appears well-developed and well-nourished.   HENT:   Head: Normocephalic and atraumatic.   Eyes: Pupils are equal, round, and reactive to light. Conjunctivae are normal.   Neck: Normal range of motion. Neck supple.   Cardiovascular: Normal rate, regular rhythm and normal heart sounds.   No murmur heard.  Pulmonary/Chest: Effort normal. No respiratory distress. He has decreased breath sounds.   Abdominal: Soft. Bowel sounds are normal. He exhibits no distension. There is no tenderness.   obese   Musculoskeletal: He exhibits no edema, tenderness or deformity.   Neurological: He is alert and oriented to person, place, and time.   Skin: Skin is warm and dry. No rash noted. No erythema.   Psychiatric: He has a normal mood and affect. His behavior is normal.   Nursing note and vitals reviewed.      Significant Labs:   Blood Culture:   Recent Labs   Lab 06/02/19  1658 06/04/19  0621   LABBLOO Gram stain andria bottle: Gram positive cocci in chains resembling Strep   Results called to and read back by:Xenia Cevallos RN 06/03/2019  06:20  Gram stain aer bottle: Gram positive cocci in chains resembling Strep  06/03/2019  08:41   Positive results previously called  STREPTOCOCCUS AGALACTIAE (GROUP B)  Beta-hemolytic streptococci are routinely susceptible to   penicillins,cephalosporins and carbapenems.  For susceptibility see order #[id number]    Gram stain andria bottle: Gram positive cocci in chains resembling Strep   Results called to and read back by:Xenia Cevallos RN 06/03/2019  06:20  Gram stain aer bottle: Gram positive cocci in chains resembling Strep  06/03/2019   09:51   Positive results previously called  STREPTOCOCCUS AGALACTIAE (GROUP B)  Beta-hemolytic streptococci are routinely susceptible to   penicillins,cephalosporins and carbapenems.   No Growth to date  No Growth to date     BMP:   Recent Labs   Lab 06/05/19 0514 06/06/19  0533   GLU 76 79    143   K 4.2 4.2    105   CO2 26 28   BUN 30* 27*   CREATININE 1.6* 1.7*   CALCIUM 9.2 9.6   MG 2.0  --      CBC:   Recent Labs   Lab 06/05/19 0514 06/06/19  0533   WBC 8.88 6.42   HGB 12.6* 13.1*   HCT 39.0* 40.3   * 180     All pertinent labs within the past 24 hours have been reviewed.    Significant Imaging: I have reviewed all pertinent imaging results/findings within the past 24 hours.

## 2019-06-06 NOTE — HPI
88 y.o. male patient with PMHx of BPH and HTN who presented to the ER for SOB . In the ER the patient was found to have a WBC of 13K. CXR showed discoid atelectasis of the right mid lung. In the ER the patient was found to have a low fever of 100.6 F.   Since admission, blood cultures are now positive for strep agalactiae .  Cardiac echo did not show any evidence of vegetation.

## 2019-06-06 NOTE — PLAN OF CARE
Consults received for home health with iv infusion. Met with patient discussed options for receiving home health and home infusion. Patient asked if I would contact his pcp, Dr Todd @ [phone]. He wanted me to ask Dr Todd who she preferred for home health and home infusion. Contacted Dr Todd, she prefers [facility]  ( [phone].) and [facility]. Contacted [facility], spoke with Admissions. [facility] accepted the patient and will begin care on Saturday. Appropriate  home health referral / discharge paperwork faxed via [employer].  Also faxed appropriate infusion paperwork to [facility]. [facility] called and she will educated patient this afternoon. Patient also asked if I would contact his friend Luma and inform her that he is discharging . Contacted Luma Diaz, she indicated that she will be able to assist the patient once he is home and that his daughter will be at the hospital this afternoon. Call received from Dasha Rincon, patient's daughter discussed discharge disposition: [facility] and [facility]. She verbalized understanding. Also discussed scheduled follow up appointment. Appointment scheduled with Dr Todd for Monday, June 10th at 11:10.    Patient to discharge once [facility] completes patient education.     Update to primary nurse.             06/06/19 1231   Post-Acute Status   Post-Acute Authorization Home Health/Hospice   Post-Acute Placement Status Set-up Complete

## 2019-06-06 NOTE — PLAN OF CARE
Problem: Adult Inpatient Plan of Care  Goal: Plan of Care Review  Outcome: Ongoing (interventions implemented as appropriate)  Patient remained free from injury. Elevated lower legs on pillow. No s/s of acute distress. 12hr chart check complete.

## 2019-06-06 NOTE — DISCHARGE SUMMARY
[facility] Hospital Medicine  Discharge Summary      Patient Name: Thai Rincon  MRN: [id number]  Admission Date: 6/2/2019  Hospital Length of Stay: 3 days  Discharge Date and Time:  06/06/2019 3:45 PM  Attending Physician: Adrián Doe MD   Discharging Provider: Delma French NP  Primary Care Provider: Enoc Frost MD      HPI:   Thai Rincon is a 88 y.o. male patient with PMHx of BPH and HTN who presented to the ER for SOB which  began at  around 1:30pm. Associated sxs include fatigue.  The patient denies any modifying factors. Daughter notes patient has been having nausea, fever, chills, and urinary frequency since the drive back from [place]. Patient denies CP, cough, gonzalez, pnd, orthopnea, palpitations, diaphoresis, headache, blurred vision, numbness, tingling, dizziness, localized weakness, abdominal pain, blood in stools, melena, hematemesis,  urgency, dysuria or hematuria. In the ER the patient was found to have a WBC of 13K. CXR showed discoid atelectasis of the right mid lung. In the ER the patient was found to have a low fever of 100.6 F.         * No surgery found *      Hospital Course:   6/3/19 No further fever today. WBC trended up to 21K. Continue current management with IV ABX. Will continue to monitor. Blood cx positive gram positive cocci in chains resembling Strep - proved to be Strep Agalactiae.  ABX changed to IV Vanc. Will repeat blood cx in am, but pt difficult blood draw - one set drawn - proved NGTD.  Infectious Ds consult pending, results of 2 D ECHO is negative for vegetation. IV Vanco discontinued and Rocephin continued. Per Infectious Ds, Rocephin 2 grams IV x 2 weeks for strep bacteremia. PICC line placed. Daughter, Clarissa, was advised of plan for discharge. Home health and [facility] was arranged. Also, a follow up appointment with pt's PCP was arranged.  Pt was seen and examined and determined to be safe and stable for discharge.      Consults:    Consults (From admission, onward)        Status Ordering Provider     Inpatient consult to Infectious Diseases  Once     Provider:  Yunior Joe MD    Acknowledged ANDERSON STOUT     Inpatient consult to Social Work  Once     Provider:  (Not yet assigned)    Completed ANDERSON STOUT          No new Assessment & Plan notes have been filed under this hospital service since the last note was generated.  Service: Hospital Medicine    Final Active Diagnoses:    Diagnosis Date Noted POA    PRINCIPAL PROBLEM:  Community acquired pneumonia [J18.9] 06/02/2019 Yes    Anemia of chronic illness [D63.8] 06/03/2019 Yes    Benign essential HTN [I10] 06/03/2019 Yes    Streptococcus agalactiae infection-bacteremia [A49.1] 06/03/2019 Yes    Acute kidney injury superimposed on chronic kidney disease [N17.9, N18.9] 06/02/2019 Yes      Problems Resolved During this Admission:    Diagnosis Date Noted Date Resolved POA    Sepsis [A41.9] 06/03/2019 06/06/2019 Yes       Discharged Condition: stable    Disposition: Home-Health Care [facility]    Follow Up:  Follow-up Information     [facility].    Why:  Infusion  Contact information:  [address]  [address]  [address]  [place] [address]  [phone]             [facility].    Specialties:  Home Health Services, Physical Therapy, Occupational Therapy  Why:  Home Health  Contact information:  [address]  [address]  [place] [address]  [phone]             Dr Todd. Go on 6/10/2019.    Why:  @ 11:10 am for your hospital follow up appointment.  Contact information:  [place]  [phone]               Patient Instructions:      Notify your health care provider if you experience any of the following:  temperature >100.4     Notify your health care provider if you experience any of the following:  persistent nausea and vomiting or diarrhea     Notify your health care provider if you experience any of the following:   difficulty breathing or increased cough     Activity as tolerated       Significant Diagnostic Studies: Labs:   CMP   Recent Labs   Lab 06/05/19  0514 06/06/19  0533    143   K 4.2 4.2    105   CO2 26 28   GLU 76 79   BUN 30* 27*   CREATININE 1.6* 1.7*   CALCIUM 9.2 9.6   PROT 6.2 6.5   ALBUMIN 3.0* 3.2*   BILITOT 0.3 0.4   ALKPHOS 74 64   AST 16 18   ALT 13 18   ANIONGAP 8 10   ESTGFRAFRICA 44* 41*   EGFRNONAA 38* 35*    and CBC   Recent Labs   Lab 06/05/19  0514 06/06/19  0533   WBC 8.88 6.42   HGB 12.6* 13.1*   HCT 39.0* 40.3   * 180       Pending Diagnostic Studies:     None         Medications:  Reconciled Home Medications:      Medication List      START taking these medications    CEFTRIAXONE 2 G/50 ML D5W (READY TO MIX)  Inject 50 mLs (2 g total) into the vein once daily. for 11 days        CONTINUE taking these medications    acetaminophen 500 MG tablet  Commonly known as:  TYLENOL  Take 500 mg by mouth every 6 (six) hours as needed for Pain.     albuterol-ipratropium  mcg  mcg/actuation inhaler  Commonly known as:  COMBIVENT  Inhale 2 puffs into the lungs every 6 (six) hours as needed for Wheezing.     aspirin 81 MG Chew  Take 81 mg by mouth once daily.     atorvastatin 10 MG tablet  Commonly known as:  LIPITOR  Take 10 mg by mouth once daily.     finasteride 5 mg tablet  Commonly known as:  PROSCAR  Take 5 mg by mouth once daily.     fluticasone propionate 50 mcg/actuation nasal spray  Commonly known as:  FLONASE  1 spray by Each Nare route once daily.     furosemide 20 MG tablet  Commonly known as:  LASIX  Take 20 mg by mouth once daily.     loratadine 10 mg tablet  Commonly known as:  CLARITIN  Take 10 mg by mouth daily as needed for Allergies.     losartan 50 MG tablet  Commonly known as:  COZAAR  Take 50 mg by mouth once daily.     metoprolol succinate 25 MG 24 hr tablet  Commonly known as:  TOPROL-XL  Take 25 mg by mouth once daily.     VENTOLIN HFA 90 mcg/actuation  inhaler  Generic drug:  albuterol  Inhale 2 puffs into the lungs every 6 (six) hours as needed for Wheezing. Rescue            Indwelling Lines/Drains at time of discharge:   Lines/Drains/Airways     Peripherally Inserted Central Catheter Line                 PICC Double Lumen right brachial -- days                Time spent on the discharge of patient: > 30 minutes  Patient was seen and examined on the date of discharge and determined to be suitable for discharge.         Delma French NP  Department of Hospital Medicine  [facility]

## 2019-06-06 NOTE — OP NOTE
Pre Op Diagnosis: bacteremia     Post Op Diagnosis: same     Procedure:  RUE picc      Procedure performed by: Emma MERCADO, Jennifer GUSTAFSON     Written Informed Consent Obtained: Yes     Specimen Removed:  no     Estimated Blood Loss:  minimal     Findings: Local anesthesia.     The patient tolerated the procedure well and there were no complications.      The RUE was sterilely prepped and draped.  Lidocaine was used as a local anesthetic.  Using u/s guidance a 5 Wolof 35 cm picc was placed into the basilic vein into the SVC/right atrium junction.  Placement was verified using X Ray.  PICC was secured in place with attachment device and is ready to use.  Pt tolerated proc well without immediate complications.

## 2019-06-06 NOTE — SUBJECTIVE & OBJECTIVE
Past Medical History:   Diagnosis Date    BPH (benign prostatic hyperplasia)     Glaucoma     Hypertension     Kidney stones        Past Surgical History:   Procedure Laterality Date    HERNIA REPAIR Right 2010       Review of patient's allergies indicates:  No Known Allergies    Medications:  Medications Prior to Admission   Medication Sig    albuterol-ipratropium  mcg (COMBIVENT)  mcg/actuation inhaler Inhale 2 puffs into the lungs every 6 (six) hours as needed for Wheezing.    aspirin 81 MG Chew Take 81 mg by mouth once daily.    atorvastatin (LIPITOR) 10 MG tablet Take 10 mg by mouth once daily.    finasteride (PROSCAR) 5 mg tablet Take 5 mg by mouth once daily.    furosemide (LASIX) 20 MG tablet Take 20 mg by mouth once daily.    losartan (COZAAR) 50 MG tablet Take 50 mg by mouth once daily.    metoprolol succinate (TOPROL-XL) 25 MG 24 hr tablet Take 25 mg by mouth once daily.    acetaminophen (TYLENOL) 500 MG tablet Take 500 mg by mouth every 6 (six) hours as needed for Pain.    albuterol (VENTOLIN HFA) 90 mcg/actuation inhaler Inhale 2 puffs into the lungs every 6 (six) hours as needed for Wheezing. Rescue    fluticasone (FLONASE) 50 mcg/actuation nasal spray 1 spray by Each Nare route once daily.    loratadine (CLARITIN) 10 mg tablet Take 10 mg by mouth daily as needed for Allergies.     Antibiotics (From admission, onward)    Start     Stop Route Frequency Ordered    06/04/19 0945  cefTRIAXone (ROCEPHIN) 1 g in dextrose 5 % 50 mL IVPB      -- IV Every 24 hours (non-standard times) 06/04/19 0844        Antifungals (From admission, onward)    None        Antivirals (From admission, onward)    None             There is no immunization history on file for this patient.    Family History     None        Social History     Socioeconomic History    Marital status:      Spouse name: Not on file    Number of children: Not on file    Years of education: Not on file    Highest  education level: Not on file   Occupational History    Not on file   Social Needs    Financial resource strain: Not on file    Food insecurity:     Worry: Not on file     Inability: Not on file    Transportation needs:     Medical: Not on file     Non-medical: Not on file   Tobacco Use    Smoking status: Never Smoker    Smokeless tobacco: Never Used   Substance and Sexual Activity    Alcohol use: No     Alcohol/week: 0.0 oz    Drug use: No    Sexual activity: Not Currently   Lifestyle    Physical activity:     Days per week: Not on file     Minutes per session: Not on file    Stress: Not on file   Relationships    Social connections:     Talks on phone: Not on file     Gets together: Not on file     Attends Temple service: Not on file     Active member of club or organization: Not on file     Attends meetings of clubs or organizations: Not on file     Relationship status: Not on file   Other Topics Concern    Not on file   Social History Narrative    Not on file     Review of Systems   Constitutional: Positive for chills and fever. Negative for activity change, appetite change, fatigue and unexpected weight change.   HENT: Negative for congestion, facial swelling, rhinorrhea, sinus pressure, sneezing and sore throat.    Eyes: Negative for discharge, redness and visual disturbance.   Respiratory: Positive for cough and shortness of breath. Negative for apnea, chest tightness, wheezing and stridor.    Cardiovascular: Negative for chest pain, palpitations and leg swelling.   Gastrointestinal: Positive for nausea. Negative for abdominal distention, abdominal pain, anal bleeding, blood in stool, constipation, diarrhea and vomiting.   Genitourinary: Negative for difficulty urinating, discharge, dysuria, frequency and hematuria.   Musculoskeletal: Negative for arthralgias, back pain, gait problem, joint swelling, myalgias, neck pain and neck stiffness.   Skin: Negative for color change and pallor.    Neurological: Negative for dizziness, tremors, seizures, syncope, facial asymmetry, speech difficulty, weakness, light-headedness, numbness and headaches.   Psychiatric/Behavioral: Negative for behavioral problems, confusion, hallucinations and suicidal ideas. The patient is not nervous/anxious.    All other systems reviewed and are negative.    Objective:     Vital Signs (Most Recent):  Temp: 97.5 °F (36.4 °C) (06/06/19 0746)  Pulse: 79 (06/06/19 0746)  Resp: 16 (06/06/19 0746)  BP: 137/78 (06/06/19 0746)  SpO2: 96 % (06/06/19 0746) Vital Signs (24h Range):  Temp:  [97.4 °F (36.3 °C)-98.7 °F (37.1 °C)] 97.5 °F (36.4 °C)  Pulse:  [65-79] 79  Resp:  [14-18] 16  SpO2:  [94 %-100 %] 96 %  BP: (137-185)/() 137/78     Weight: 93.6 kg (206 lb 5.6 oz)  Body mass index is 30.47 kg/m².    Estimated Creatinine Clearance: 33.9 mL/min (A) (based on SCr of 1.7 mg/dL (H)).    Physical Exam   Constitutional: He is oriented to person, place, and time. He appears well-developed and well-nourished.   HENT:   Head: Normocephalic and atraumatic.   Eyes: Pupils are equal, round, and reactive to light. Conjunctivae are normal.   Neck: Normal range of motion. Neck supple.   Cardiovascular: Normal rate, regular rhythm and normal heart sounds.   No murmur heard.  Pulmonary/Chest: Effort normal. No respiratory distress. He has decreased breath sounds.   Abdominal: Soft. Bowel sounds are normal. He exhibits no distension. There is no tenderness.   obese   Musculoskeletal: He exhibits no edema, tenderness or deformity.   Neurological: He is alert and oriented to person, place, and time.   Skin: Skin is warm and dry. No rash noted. No erythema.   Psychiatric: He has a normal mood and affect. His behavior is normal.   Nursing note and vitals reviewed.      Significant Labs:   Blood Culture:   Recent Labs   Lab 06/02/19  1658 06/04/19  0621   LABBLOO Gram stain andria bottle: Gram positive cocci in chains resembling Strep   Results called  to and read back by:Xenia Cevallos RN 06/03/2019  06:20  Gram stain aer bottle: Gram positive cocci in chains resembling Strep  06/03/2019  08:41   Positive results previously called  STREPTOCOCCUS AGALACTIAE (GROUP B)  Beta-hemolytic streptococci are routinely susceptible to   penicillins,cephalosporins and carbapenems.  For susceptibility see order #[id number]    Gram stain andria bottle: Gram positive cocci in chains resembling Strep   Results called to and read back by:Xenia Cevallos RN 06/03/2019  06:20  Gram stain aer bottle: Gram positive cocci in chains resembling Strep  06/03/2019  09:51   Positive results previously called  STREPTOCOCCUS AGALACTIAE (GROUP B)  Beta-hemolytic streptococci are routinely susceptible to   penicillins,cephalosporins and carbapenems.   No Growth to date  No Growth to date     BMP:   Recent Labs   Lab 06/05/19  0514 06/06/19  0533   GLU 76 79    143   K 4.2 4.2    105   CO2 26 28   BUN 30* 27*   CREATININE 1.6* 1.7*   CALCIUM 9.2 9.6   MG 2.0  --      CBC:   Recent Labs   Lab 06/05/19  0514 06/06/19  0533   WBC 8.88 6.42   HGB 12.6* 13.1*   HCT 39.0* 40.3   * 180     All pertinent labs within the past 24 hours have been reviewed.    Significant Imaging: I have reviewed all pertinent imaging results/findings within the past 24 hours.

## 2019-06-07 NOTE — PHYSICIAN QUERY
PT Name: Thai Rincon  MR #: [id number]  Physician Query Form - CKD Clarification     CDS/: Lilia Kim               Contact information:[email or web address]  This form is a permanent document in the medical record.     Query Date: June 7, 2019    By submitting this query, we are merely seeking further clarification of documentation. Please utilize your independent clinical judgment when addressing the question(s) below.    The Medical record contains the following:     Indicators   Supporting Clinical Findings   Location in Medical Record   x CKD or Chronic Kidney (Renal) Failure / Disease Acute kidney injury superimposed on chronic kidney disease    6/4 - likely has degree of CKD (1.9, 1.6, 1.8)          HM PN 6/4   x BUN/Creatinine                          GFR BUN = 30--->33---->27    Creatinine = 1.9--->1.6--->1.7    GFR = 36--->44--->38--->41         Labs 6/2 - 6/6   x Dehydration DAILY due to dehydration/IVVD    H&P 6/2   x Nausea / Vomiting patient has been having nausea, fever, chills, and urinary frequency    ED Provider Notes 6/2    Dialysis / CRRT      Medication      Treatment     x Other Chronic Conditions PMHx of BPH and HTN    H&P 6/2    Other       Provider, please further specify the stage of CKD.    [ x  ] Chronic Kidney Disease (CKD) (please specify stage* below)      [employer] Definitions     Stage Description eGFR (mL/min)   [   ]    I Slight kidney damage with normal or increased filtration 90+   [   ]   II Mildly reduced kidney function 60-89   [  x ]    III Moderately reduced kidney function 30-59   [   ] Other (please specify): ____________    [   ]  Clinically Undetermined          Please document in your progress notes daily for the duration of treatment until resolved and include in your discharge summary.

## 2019-06-08 NOTE — PLAN OF CARE
06/08/19 1538   Final Note   Assessment Type Final Discharge Note   Anticipated Discharge Disposition Home-Health   Right Care Referral Info   Post Acute Recommendation Home-care   Facility Name [facility]

## 2019-06-09 LAB — BACTERIA BLD CULT: NORMAL

## 2019-06-10 ENCOUNTER — PATIENT OUTREACH (OUTPATIENT)
Dept: ADMINISTRATIVE | Facility: CLINIC | Age: 84
End: 2019-06-10

## 2019-06-10 NOTE — PATIENT INSTRUCTIONS
When You Have Pneumonia  You have been diagnosed with pneumonia. This is a serious lung infection. Most cases of pneumonia are caused by bacteria. Pneumonia most often occurs in older adults, young children, and people with chronic health problems.  Home care  · Take your medicine exactly as directed. Dont skip doses. Continue taking your antibiotics as until they are all gone, even if you start to feel better. This will prevent the pneumonia from coming back.  · Drink at least 8 glasses of water daily, unless directed otherwise. This helps to loosen and thin secretions so that you can cough them up.  · Use a cool-mist humidifier in your bedroom. Be sure to clean the humidifier daily.  · Dont use medicines to suppress your cough unless your cough is dry, painful, or interferes with your sleep. Coughing up mucus is normal. You may use an expectorant if your healthcare provider says its okay.  · You can use warm compresses or a heating pad on the lowest setting to relieve chest discomfort. Use several times a day for 15-20 minutes at a time. To prevent injury to your skin, set the temperature to warm, not hot. Dont put the compress or pad directly on your skin. Make certain it has a cover or wrap it in a towel. This is to prevent skin burns.  · Get plenty of rest until your fever, shortness of breath, and chest pain go away.  · Plan to get a flu shot every year. The flu is a common cause of pneumonia. Getting a flu shot every year can help prevent both the flu and pneumonia.  Getting the pneumococcal vaccine  Talk with your healthcare provider about getting the pneumococcalvaccine. Pneumococcal pneumonia is caused by bacteria that spread from person to person. It can cause minor problems, such as ear infections. But it can also turn into life-threatening illnesses of the lungs (pneumonia), the covering of the brain and spinal cord (meningitis), and the blood (bacteremia).  Children under 2 years of age, adults  over age 65, people with certain health conditions, and smokers are at the highest risk of pneumococcal disease. This vaccine can help prevent pneumococcal disease in both adults and children. Some people should not have the vaccine. Make sure to ask your healthcare provider if you should have the vaccine.   Follow-up care  Make a follow-up appointment as directed by our staff.  When to call your healthcare provider  Call your healthcare provider if you have any of the following:  · Fever above 100.4°F (38°C), or as directed by your healthcare provider  · Mucus from the lungs (sputum) thats yellow, green, bloody, or smells bad  · A large amount of sputum  · Vomiting  · Symptoms that get worse  When to call 911  Call 911 right away if you have any of the following:  · Chest pain  · Trouble breathing  · Blue lips or fingernails   Date Last Reviewed: 11/1/2016  © 9997-3743 [employer]. [address] [place] [address], [place] [address]. All rights reserved. This information is not intended as a substitute for professional medical care. Always follow your healthcare professional's instructions.

## 2021-04-16 ENCOUNTER — PATIENT MESSAGE (OUTPATIENT)
Dept: RESEARCH | Facility: HOSPITAL | Age: 86
End: 2021-04-16

## 2022-01-27 ENCOUNTER — TELEPHONE (OUTPATIENT)
Dept: UROLOGY | Facility: CLINIC | Age: 87
End: 2022-01-27
Payer: MEDICARE

## 2022-01-28 ENCOUNTER — OFFICE VISIT (OUTPATIENT)
Dept: UROLOGY | Facility: CLINIC | Age: 87
End: 2022-01-28
Payer: MEDICARE

## 2022-01-28 VITALS
SYSTOLIC BLOOD PRESSURE: 154 MMHG | DIASTOLIC BLOOD PRESSURE: 79 MMHG | BODY MASS INDEX: 28.38 KG/M2 | HEART RATE: 58 BPM | HEIGHT: 69 IN | WEIGHT: 191.63 LBS

## 2022-01-28 DIAGNOSIS — N28.9 RENAL LESION: ICD-10-CM

## 2022-01-28 DIAGNOSIS — N40.2 NODULAR PROSTATE: ICD-10-CM

## 2022-01-28 DIAGNOSIS — N18.30 STAGE 3 CHRONIC KIDNEY DISEASE, UNSPECIFIED WHETHER STAGE 3A OR 3B CKD: Primary | ICD-10-CM

## 2022-01-28 DIAGNOSIS — N40.0 ENLARGED PROSTATE: ICD-10-CM

## 2022-01-28 PROCEDURE — 1159F PR MEDICATION LIST DOCUMENTED IN MEDICAL RECORD: ICD-10-PCS | Mod: CPTII,S$GLB,, | Performed by: UROLOGY

## 2022-01-28 PROCEDURE — 3288F PR FALLS RISK ASSESSMENT DOCUMENTED: ICD-10-PCS | Mod: CPTII,S$GLB,, | Performed by: UROLOGY

## 2022-01-28 PROCEDURE — 1159F MED LIST DOCD IN RCRD: CPT | Mod: CPTII,S$GLB,, | Performed by: UROLOGY

## 2022-01-28 PROCEDURE — 99204 OFFICE O/P NEW MOD 45 MIN: CPT | Mod: S$GLB,,, | Performed by: UROLOGY

## 2022-01-28 PROCEDURE — 1126F PR PAIN SEVERITY QUANTIFIED, NO PAIN PRESENT: ICD-10-PCS | Mod: CPTII,S$GLB,, | Performed by: UROLOGY

## 2022-01-28 PROCEDURE — 99204 PR OFFICE/OUTPT VISIT, NEW, LEVL IV, 45-59 MIN: ICD-10-PCS | Mod: S$GLB,,, | Performed by: UROLOGY

## 2022-01-28 PROCEDURE — 1126F AMNT PAIN NOTED NONE PRSNT: CPT | Mod: CPTII,S$GLB,, | Performed by: UROLOGY

## 2022-01-28 PROCEDURE — 3288F FALL RISK ASSESSMENT DOCD: CPT | Mod: CPTII,S$GLB,, | Performed by: UROLOGY

## 2022-01-28 PROCEDURE — 1101F PR PT FALLS ASSESS DOC 0-1 FALLS W/OUT INJ PAST YR: ICD-10-PCS | Mod: CPTII,S$GLB,, | Performed by: UROLOGY

## 2022-01-28 PROCEDURE — 1101F PT FALLS ASSESS-DOCD LE1/YR: CPT | Mod: CPTII,S$GLB,, | Performed by: UROLOGY

## 2022-01-28 RX ORDER — DONEPEZIL HYDROCHLORIDE 5 MG/1
5 TABLET, FILM COATED ORAL
COMMUNITY
Start: 2021-07-13 | End: 2022-02-10

## 2022-01-28 NOTE — PROGRESS NOTES
"Subjective:      Thai Rincon is a 89 y.o. male who returns today regarding his     Referred for "renal mass" but there is no mention of renal mass in his records.    On lasix  Urinary freq  No hematuria    No personal or family history of prostate cancer    He is on proscar but is not sure if he has seen a urologist in the past    Feels well  No weight loss    The following portions of the patient's history were reviewed and updated as appropriate: allergies, current medications, past family history, past medical history, past social history, past surgical history and problem list.    Review of Systems  Pertinent items are noted in HPI.  A comprehensive multipoint review of systems was negative except as otherwise stated in the HPI.    Past Medical History:   Diagnosis Date    BPH (benign prostatic hyperplasia)     Glaucoma     Hypertension     Kidney stones      Past Surgical History:   Procedure Laterality Date    HERNIA REPAIR Right 2010       Review of patient's allergies indicates:  No Known Allergies       Objective:   Vitals: BP (!) 154/79   Pulse (!) 58   Ht 5' 9" (1.753 m)   Wt 86.9 kg (191 lb 9.6 oz)   BMI 28.29 kg/m²     Physical Exam   General: alert and oriented, no acute distress  Respiratory: Symmetric expansion, non-labored breathing  Cardiovascular: no peripheral edema  Abdomen: soft, non distended  Skin: normal coloration and turgor, no rashes, no suspicious skin lesions noted  Neuro: no gross deficits  Psych: normal judgment and insight, normal mood/affect and non-anxious  KENDRA prostate is nodular and very hard    Physical Exam    Lab Review   Urinalysis demonstrates no specimen today     Ref Range & Units 1 mo ago   Color - [facility] YELLOW YELLOW    Appearance - [facility] CLEAR CLEAR    Specific Gravity - [facility] 1.001 - 1.035 1.007    PH - [facility] 5.0 - 8.0 5.5    Glucose - [facility] NEGATIVE NEGATIVE    Bilirubin - [facility] NEGATIVE NEGATIVE    Ketones - [facility] NEGATIVE NEGATIVE    Urine Occult Blood - " [facility] NEGATIVE NEGATIVE    Protein - [facility] NEGATIVE NEGATIVE    Nitrite - [facility] NEGATIVE NEGATIVE    Leukocyte Esterase - [facility] NEGATIVE NEGATIVE    WBC - [facility] < OR = 5 /HPF NONE SEEN    RBC - [facility] < OR = 2 /HPF NONE SEEN    Squamous Epithelial Cells - [facility] < OR = 5 /HPF NONE SEEN    Bacteria - [facility] NONE SEEN /HPF NONE SEEN    Hyaline Cast - [facility] NONE SEEN /LPF NONE SEEN    [facility]ing Agency  [facility]     Narrative  Performed by [facility]  FASTING:NO     FASTING: NO  Specimen Collected: 12/01/21 15:39 Last Resulted: 12/02/21 15:35         Lab Results   Component Value Date    WBC 6.42 06/06/2019    HGB 13.1 (L) 06/06/2019    HCT 40.3 06/06/2019    MCV 93 06/06/2019     06/06/2019     Lab Results   Component Value Date    CREATININE 1.7 (H) 06/06/2019    BUN 27 (H) 06/06/2019       Imaging  -  Assessment and Plan:   Stage 3 chronic kidney disease, unspecified whether stage 3a or 3b CKD  Per Dr Guerra    Renal lesion  No imaging is available for me to review; I checked epic and care everywhere  Ab US  RTC after above to see NP; check PVR and UA    Enlarged prostate  Cont proscar  Check PVR and UA    Nodular prostate very suspicious for prostate ca  psa today  RTC after above; will likely schedule prostate biopsy

## 2022-02-03 ENCOUNTER — HOSPITAL ENCOUNTER (OUTPATIENT)
Dept: RADIOLOGY | Facility: OTHER | Age: 87
Discharge: HOME | End: 2022-02-03
Attending: UROLOGY
Payer: MEDICARE

## 2022-02-03 DIAGNOSIS — N18.30 STAGE 3 CHRONIC KIDNEY DISEASE, UNSPECIFIED WHETHER STAGE 3A OR 3B CKD: ICD-10-CM

## 2022-02-03 DIAGNOSIS — N40.0 ENLARGED PROSTATE: ICD-10-CM

## 2022-02-03 DIAGNOSIS — N40.2 NODULAR PROSTATE: ICD-10-CM

## 2022-02-03 DIAGNOSIS — N28.9 RENAL LESION: ICD-10-CM

## 2022-02-03 PROCEDURE — 76700 US EXAM ABDOM COMPLETE: CPT | Mod: 26,,, | Performed by: RADIOLOGY

## 2022-02-03 PROCEDURE — 76700 US ABDOMEN COMPLETE: ICD-10-PCS | Mod: 26,,, | Performed by: RADIOLOGY

## 2022-02-03 PROCEDURE — 76700 US EXAM ABDOM COMPLETE: CPT | Mod: TC

## 2022-02-10 ENCOUNTER — OFFICE VISIT (OUTPATIENT)
Dept: UROLOGY | Facility: CLINIC | Age: 87
End: 2022-02-10
Payer: MEDICARE

## 2022-02-10 VITALS
RESPIRATION RATE: 18 BRPM | BODY MASS INDEX: 27.98 KG/M2 | WEIGHT: 189.5 LBS | HEART RATE: 62 BPM | SYSTOLIC BLOOD PRESSURE: 158 MMHG | TEMPERATURE: 98 F | DIASTOLIC BLOOD PRESSURE: 78 MMHG

## 2022-02-10 DIAGNOSIS — R35.0 URINARY FREQUENCY: ICD-10-CM

## 2022-02-10 DIAGNOSIS — R97.20 ELEVATED PSA: ICD-10-CM

## 2022-02-10 DIAGNOSIS — N18.30 STAGE 3 CHRONIC KIDNEY DISEASE, UNSPECIFIED WHETHER STAGE 3A OR 3B CKD: Primary | ICD-10-CM

## 2022-02-10 LAB
BILIRUB SERPL-MCNC: NEGATIVE MG/DL
BLOOD URINE, POC: ABNORMAL
CLARITY, POC UA: CLEAR
COLOR, POC UA: YELLOW
GLUCOSE UR QL STRIP: NORMAL
KETONES UR QL STRIP: NEGATIVE
LEUKOCYTE ESTERASE URINE, POC: ABNORMAL
NITRITE, POC UA: NEGATIVE
PH, POC UA: 5
PROTEIN, POC: 30
SPECIFIC GRAVITY, POC UA: 1.01
UROBILINOGEN, POC UA: NORMAL

## 2022-02-10 PROCEDURE — 1101F PT FALLS ASSESS-DOCD LE1/YR: CPT | Mod: CPTII,S$GLB,, | Performed by: NURSE PRACTITIONER

## 2022-02-10 PROCEDURE — 81002 URINALYSIS NONAUTO W/O SCOPE: CPT | Mod: S$GLB,,, | Performed by: NURSE PRACTITIONER

## 2022-02-10 PROCEDURE — 3288F FALL RISK ASSESSMENT DOCD: CPT | Mod: CPTII,S$GLB,, | Performed by: NURSE PRACTITIONER

## 2022-02-10 PROCEDURE — 3288F PR FALLS RISK ASSESSMENT DOCUMENTED: ICD-10-PCS | Mod: CPTII,S$GLB,, | Performed by: NURSE PRACTITIONER

## 2022-02-10 PROCEDURE — 99213 PR OFFICE/OUTPT VISIT, EST, LEVL III, 20-29 MIN: ICD-10-PCS | Mod: S$GLB,,, | Performed by: NURSE PRACTITIONER

## 2022-02-10 PROCEDURE — 1160F RVW MEDS BY RX/DR IN RCRD: CPT | Mod: CPTII,S$GLB,, | Performed by: NURSE PRACTITIONER

## 2022-02-10 PROCEDURE — 87086 URINE CULTURE/COLONY COUNT: CPT | Performed by: NURSE PRACTITIONER

## 2022-02-10 PROCEDURE — 1159F PR MEDICATION LIST DOCUMENTED IN MEDICAL RECORD: ICD-10-PCS | Mod: CPTII,S$GLB,, | Performed by: NURSE PRACTITIONER

## 2022-02-10 PROCEDURE — 1160F PR REVIEW ALL MEDS BY PRESCRIBER/CLIN PHARMACIST DOCUMENTED: ICD-10-PCS | Mod: CPTII,S$GLB,, | Performed by: NURSE PRACTITIONER

## 2022-02-10 PROCEDURE — 1159F MED LIST DOCD IN RCRD: CPT | Mod: CPTII,S$GLB,, | Performed by: NURSE PRACTITIONER

## 2022-02-10 PROCEDURE — 99213 OFFICE O/P EST LOW 20 MIN: CPT | Mod: S$GLB,,, | Performed by: NURSE PRACTITIONER

## 2022-02-10 PROCEDURE — 1126F AMNT PAIN NOTED NONE PRSNT: CPT | Mod: CPTII,S$GLB,, | Performed by: NURSE PRACTITIONER

## 2022-02-10 PROCEDURE — 1126F PR PAIN SEVERITY QUANTIFIED, NO PAIN PRESENT: ICD-10-PCS | Mod: CPTII,S$GLB,, | Performed by: NURSE PRACTITIONER

## 2022-02-10 PROCEDURE — 1101F PR PT FALLS ASSESS DOC 0-1 FALLS W/OUT INJ PAST YR: ICD-10-PCS | Mod: CPTII,S$GLB,, | Performed by: NURSE PRACTITIONER

## 2022-02-10 PROCEDURE — 81002 POCT URINE DIPSTICK WITHOUT MICROSCOPE: ICD-10-PCS | Mod: S$GLB,,, | Performed by: NURSE PRACTITIONER

## 2022-02-10 RX ORDER — TAMSULOSIN HYDROCHLORIDE 0.4 MG/1
0.4 CAPSULE ORAL
COMMUNITY
Start: 2021-07-13

## 2022-02-10 RX ORDER — CIPROFLOXACIN 500 MG/1
500 TABLET ORAL 2 TIMES DAILY
Qty: 4 TABLET | Refills: 0 | Status: SHIPPED | OUTPATIENT
Start: 2022-02-10 | End: 2022-02-12

## 2022-02-10 NOTE — PROGRESS NOTES
Subjective:      Thai Rincon is a 89 y.o. male who returns today regarding his elevated PSA. He is an established patient of Dr. Germain and is new to me today.    Elevated PSA  Patient is here with an elevated PSA. He has no personal history and no family history of prostate cancer. His reports bothersome LUTS - on flomax and proscar. He has no prior genitourinary history of hematuria, hematospermia, prostatitis, UTI, erectile dysfunction, urolithiasis, epididymal orchitis, previous  surgery.  Previous PSA values are :  Component      Latest Ref Rng & Units 1/28/2022   PSA Diagnostic      0.00 - 4.00 ng/mL 88.2 (H)     Reports unintentional weight loss- about 30-40 lbs over the last several years. Denies new bone or joint pains.     The following portions of the patient's history were reviewed and updated as appropriate: allergies, current medications, past family history, past medical history, past social history, past surgical history and problem list.    Review of Systems  Constitutional: no fever or chills  ENT: no nasal congestion or sore throat  Respiratory: no cough or shortness of breath  Cardiovascular: no chest pain or palpitations  Gastrointestinal: no nausea or vomiting, tolerating diet  Genitourinary: as per HPI  Hematologic/Lymphatic: no easy bruising or lymphadenopathy  Musculoskeletal: no arthralgias or myalgias  Neurological: no seizures or tremors  Behavioral/Psych: no auditory or visual hallucinations     Objective:   Vitals:   Vitals:    02/10/22 1359   BP: (!) 158/78   Pulse: 62   Resp: 18   Temp: 98.1 °F (36.7 °C)     Physical Exam   General: alert and oriented, no acute distress  Head: normocephalic, atraumatic  Neck: supple, normal ROM  Respiratory: Symmetric expansion, non-labored breathing  Cardiovascular: regular rate and rhythm  Abdomen: soft, non tender, non distended   Genitourinary: deferred   Skin: normal coloration and turgor, no rashes, no suspicious skin lesions  "noted  Neuro: alert and oriented x3, no gross deficits  Psych: normal judgment and insight, normal mood/affect and non-anxious    Lab Review   Urinalysis demonstrates positive for leukocytes, red blood cells  PVR: 40 mL  Lab Results   Component Value Date    WBC 6.42 06/06/2019    HGB 13.1 (L) 06/06/2019    HCT 40.3 06/06/2019    MCV 93 06/06/2019     06/06/2019     Lab Results   Component Value Date    CREATININE 1.7 (H) 06/06/2019    BUN 27 (H) 06/06/2019     No results found for: PSA  Imaging   (all images personally reviewed; agree with report below)  Abdominal US- "Cholelithiasis.  Bilateral kidneys demonstrate cortical thinning which can be seen with chronic medical kidney disease.  Benign right kidney cyst."      Assessment:   Stage 3 chronic kidney disease, unspecified whether stage 3a or 3b CKD  Elevated PSA  Urinary frequency     Plan:   Thai was seen today for follow-up.    Diagnoses and all orders for this visit:    Stage 3 chronic kidney disease, unspecified whether stage 3a or 3b CKD  -     POCT URINE DIPSTICK WITHOUT MICROSCOPE    Elevated PSA  -     Urine culture  -     ciprofloxacin HCl (CIPRO) 500 MG tablet; Take 1 tablet (500 mg total) by mouth 2 (two) times daily. for 4 doses  -     Transrectal Ultrasound w/ Biopsy; Future    Urinary frequency   -     Urine culture  -     ciprofloxacin HCl (CIPRO) 500 MG tablet; Take 1 tablet (500 mg total) by mouth 2 (two) times daily. for 4 doses    Plan:  --We discussed this significance of his elevated PSA, various benign etiologies including BPH and infection, and the associated risk of prostate cancer.    --TRUS/bx ASAP (instruction handout reviewed)  --Urine culture     "

## 2022-02-11 LAB — BACTERIA UR CULT: NO GROWTH

## 2022-03-07 ENCOUNTER — TELEPHONE (OUTPATIENT)
Dept: UROLOGY | Facility: CLINIC | Age: 87
End: 2022-03-07

## 2022-03-07 ENCOUNTER — PROCEDURE VISIT (OUTPATIENT)
Dept: UROLOGY | Facility: CLINIC | Age: 87
End: 2022-03-07
Payer: MEDICARE

## 2022-03-07 ENCOUNTER — TELEPHONE (OUTPATIENT)
Dept: UROLOGY | Facility: CLINIC | Age: 87
End: 2022-03-07
Payer: MEDICARE

## 2022-03-07 VITALS
BODY MASS INDEX: 28.22 KG/M2 | TEMPERATURE: 98 F | HEIGHT: 69 IN | DIASTOLIC BLOOD PRESSURE: 66 MMHG | RESPIRATION RATE: 16 BRPM | WEIGHT: 190.5 LBS | HEART RATE: 54 BPM | SYSTOLIC BLOOD PRESSURE: 151 MMHG

## 2022-03-07 DIAGNOSIS — R97.20 ELEVATED PSA: Primary | ICD-10-CM

## 2022-03-07 DIAGNOSIS — C61 PROSTATE CANCER: ICD-10-CM

## 2022-03-07 PROCEDURE — 96372 PR INJECTION,THERAP/PROPH/DIAG2ST, IM OR SUBCUT: ICD-10-PCS | Mod: S$GLB,,, | Performed by: UROLOGY

## 2022-03-07 PROCEDURE — 96372 THER/PROPH/DIAG INJ SC/IM: CPT | Mod: S$GLB,,, | Performed by: UROLOGY

## 2022-03-07 PROCEDURE — 88305 TISSUE EXAM BY PATHOLOGIST: CPT | Mod: 59 | Performed by: PATHOLOGY

## 2022-03-07 PROCEDURE — 55700 TRANSRECTAL ULTRASOUND W/ BIOPSY: ICD-10-PCS | Mod: S$GLB,,, | Performed by: UROLOGY

## 2022-03-07 PROCEDURE — 55700 TRANSRECTAL ULTRASOUND W/ BIOPSY: CPT | Mod: S$GLB,,, | Performed by: UROLOGY

## 2022-03-07 PROCEDURE — 76872 TRANSRECTAL ULTRASOUND W/ BIOPSY: ICD-10-PCS | Mod: 26,S$GLB,, | Performed by: UROLOGY

## 2022-03-07 PROCEDURE — 76872 US TRANSRECTAL: CPT | Mod: 26,S$GLB,, | Performed by: UROLOGY

## 2022-03-07 PROCEDURE — 88305 TISSUE EXAM BY PATHOLOGIST: ICD-10-PCS | Mod: 26,,, | Performed by: PATHOLOGY

## 2022-03-07 PROCEDURE — 88305 TISSUE EXAM BY PATHOLOGIST: CPT | Mod: 26,,, | Performed by: PATHOLOGY

## 2022-03-07 RX ORDER — LIDOCAINE HYDROCHLORIDE 10 MG/ML
20 INJECTION INFILTRATION; PERINEURAL
Status: COMPLETED | OUTPATIENT
Start: 2022-03-07 | End: 2022-03-07

## 2022-03-07 RX ORDER — LIDOCAINE HYDROCHLORIDE 20 MG/ML
JELLY TOPICAL
Status: COMPLETED | OUTPATIENT
Start: 2022-03-07 | End: 2022-03-07

## 2022-03-07 RX ORDER — CEFTRIAXONE 1 G/1
1 INJECTION, POWDER, FOR SOLUTION INTRAMUSCULAR; INTRAVENOUS
Status: COMPLETED | OUTPATIENT
Start: 2022-03-07 | End: 2022-03-07

## 2022-03-07 RX ADMIN — LIDOCAINE HYDROCHLORIDE 20 ML: 10 INJECTION INFILTRATION; PERINEURAL at 02:03

## 2022-03-07 RX ADMIN — LIDOCAINE HYDROCHLORIDE: 20 JELLY TOPICAL at 02:03

## 2022-03-07 RX ADMIN — CEFTRIAXONE 1 G: 1 INJECTION, POWDER, FOR SOLUTION INTRAMUSCULAR; INTRAVENOUS at 02:03

## 2022-03-07 NOTE — TELEPHONE ENCOUNTER
Nurse navigator contacted patient's wife for scheduling urology, medical oncology and radiation oncology visits on 3/22 per Dr. Duran. Date, time, and location discussed with patient's wife.Verbalized understanding. Contact information reviewed for further needs.

## 2022-03-07 NOTE — PATIENT INSTRUCTIONS
What to Expect After a Prostate Biopsy    Please be sure to finish your pre-procedure antibiotics as instructed.    You may have mild bleeding from the rectum or urine for about 1 week to 1 month, or in your ejaculate for several months. This bleeding is normal and expected, and it will stop. You may have mild discomfort in your rectal or urethral area for 24-48 hours.    You cannot do any strenuous lifting, straining, or exercising for 24 hours. You may return to full activity the day after the biopsy.    You may continue to take all your regular medications after the procedure except for the blood thinners.    You may resume all blood-thinning medications once you no longer see any bleeding or whenever your physician prescribing the medication says it is all right to do so. You may take Tylenol if you have a fever and your temperature is less than 100° F or if you have some discomfort.    You will receive a call from the Urology Department at [facility] with the results of your prostate biopsy within one week.    Signs and Symptoms to Report    Call your [facility] urologist at [phone] if you develop any of the following:  Temperature greater than 101°  F  Inability to urinate  A large amount of bleeding from the rectum or in the urine  Persistent or severe pain    After hours or on weekends, you may reach a urology resident on call at this number: [phone].

## 2022-03-07 NOTE — PROCEDURES
"Thai Rincon is a 89 y.o. male patient.    Temp: 97.9 °F (36.6 °C) (03/07/22 1350)  Pulse: (!) 54 (03/07/22 1350)  Resp: 16 (03/07/22 1350)  BP: (!) 151/66 (03/07/22 1350)  Weight: 86.4 kg (190 lb 8 oz) (03/07/22 1350)  Height: 5' 9" (175.3 cm) (03/07/22 1350)       Transrectal Ultrasound w/ Biopsy    Date/Time: 3/7/2022 2:15 PM  Performed by: Khanh Arechiga MD  Authorized by: Freda Graff NP     Consent Done?:  Yes (Written)  Indications: Prostate Nodules and Elevated PSA    Preparation: Patient was prepped and draped in usual sterile fashion    Position:  Left lateral  Anesthesia:  20cc's 1% Lidocaine and Lidocaine jelly  Patient sedated: No    Prostate Size:  52  Lesions:: Yes (diffusely hypoechoic lesions with prostate and SVs)         Type:  Hypoechoic and Mixed hypo- and hyperechoic  Left Base Biopsies: 1  Left Mid Biopsies: 1  Left Arena Biopsies: 1  Right Base Biopsies: 1  Right Mid Biopsies: 1  Right Arena Biopsies: 1  Transitional zone: No    Total Biopsies:  6    Patient tolerance:  Patient tolerated the procedure well with no immediate complications        3/7/2022  "

## 2022-03-11 LAB
FINAL PATHOLOGIC DIAGNOSIS: NORMAL
GROSS: NORMAL
Lab: NORMAL

## 2022-03-21 NOTE — PROGRESS NOTES
Subjective:       Patient ID: Thai Rincon is a 89 y.o. male.    Chief Complaint: No chief complaint on file.    HPI     Diagnosis: Prostate cancer  Prelim cT2c, PSA: 88.2 ng/ml    Oncology History:  - 1/28/2022 PSA 88.2 ng/ml  - 3/7/2022 s/p TRUS prostate biopsy with 52gm gland, 6/6 cores involved with prostatic adenocarcinoma (Trace score 4+4=8; Grade Group 4), 2/6 cores with GG4.    PMH:  Active Ambulatory Problems     Diagnosis Date Noted    Community acquired pneumonia 06/02/2019    Acute kidney injury superimposed on chronic kidney disease 06/02/2019    Anemia of chronic illness 06/03/2019    Benign essential HTN 06/03/2019    Streptococcus agalactiae infection-bacteremia 06/03/2019    Prostate cancer 03/22/2022     Resolved Ambulatory Problems     Diagnosis Date Noted    Sepsis 06/03/2019     Past Medical History:   Diagnosis Date    BPH (benign prostatic hyperplasia)     Elevated PSA     Glaucoma     Hypertension     Kidney stones      SH:  Lives with his girlfriend  9 children   No tobacco or EtOH  Retired from working at a chemical plant    FH:  Denies cancers    Review of Systems   Constitutional: Positive for fatigue. Negative for activity change, appetite change, fever and unexpected weight change.   Respiratory: Negative for cough and shortness of breath.    Cardiovascular: Negative for chest pain and leg swelling.   Genitourinary: Negative for difficulty urinating.   Neurological: Negative for weakness.         Objective:      Physical Exam  Vitals and nursing note reviewed.       Deferred  Assessment:       Problem List Items Addressed This Visit     Prostate cancer      Other Visit Diagnoses     Elevated PSA              Plan:         PET [facility] for staging  RTC post to finalize plan    Route Chart for Scheduling    Med Onc Chart Routing      Follow up with physician . PET scan-- see me post   Follow up with [facility]    Labs    Imaging    Pharmacy appointment    Other referrals

## 2022-03-21 NOTE — PROGRESS NOTES
Subjective:      Thai Rincon is a 89 y.o. male who returns today regarding his     Here to review path.    freq but no obstructive symptoms  No complications with biopsy    The following portions of the patient's history were reviewed and updated as appropriate: allergies, current medications, past family history, past medical history, past social history, past surgical history and problem list.    Review of Systems  Pertinent items are noted in HPI.  A comprehensive multipoint review of systems was negative except as otherwise stated in the HPI.    Past Medical History:   Diagnosis Date    BPH (benign prostatic hyperplasia)     Elevated PSA     Glaucoma     Hypertension     Kidney stones      Past Surgical History:   Procedure Laterality Date    HERNIA REPAIR Right 2010       Review of patient's allergies indicates:  No Known Allergies       Objective:   Vitals: There were no vitals taken for this visit.    Physical Exam   General: alert and oriented, no acute distress  Respiratory: Symmetric expansion, non-labored breathing  Cardiovascular: no peripheral edema  Abdomen: soft, non distended  Skin: normal coloration and turgor, no rashes, no suspicious skin lesions noted  Neuro: no gross deficits  Psych: normal judgment and insight, normal mood/affect and non-anxious  Previous KENDRA prostate is nodular and very hard  Physical Exam    Lab Review   Urinalysis demonstrates unable to give specimen    Lab Results   Component Value Date    WBC 6.42 06/06/2019    HGB 13.1 (L) 06/06/2019    HCT 40.3 06/06/2019    MCV 93 06/06/2019     06/06/2019     Lab Results   Component Value Date    CREATININE 1.7 (H) 06/06/2019    BUN 27 (H) 06/06/2019     Lab Results   Component Value Date    PSADIAG 88.2 (H) 01/28/2022     Final Pathologic Diagnosis 1.  Prostate, left apex, biopsy:   - Prostatic adenocarcinoma ([place] score 4+3=7; Grade Group 3; 70% pattern   4); in 1 of 1 cores, measuring 16 mm in greatest linear  extent and 70% of   core needle tissue; perineural invasion is present   2.  Prostate, left middle, biopsy:   - Prostatic adenocarcinoma (Trace score 4+3=7; Grade Group 3;  70% pattern   4); in  1 of 1  cores, measuring 15 mm in greatest linear extent and  90% of   core needle tissue   3.  Prostate, left base, biopsy:   - Prostatic adenocarcinoma (Trace score 4+4=8; Grade Group 4); in  1 of 1   cores, measuring  20mm in greatest linear extent and  100 % of core needle   tissue; perineural invasion is present   4.  Prostate, right apex, biopsy:   - Prostatic adenocarcinoma (Trace score 4+3=7; Grade Group 3;  90% pattern   4); in 1  of 1  cores, measuring  17 mm in greatest linear extent and  90% of   core needle tissue   5.  Prostate, right mid, biopsy:   - Prostatic adenocarcinoma ([place] score 4+3=7; Grade Group 3;  90% pattern   4); in 1  of 1  cores, measuring  17 mm in greatest linear extent and  90% of   core needle tissue   6.  Prostate, right base, biopsy:   - Prostatic adenocarcinoma (Trace score 4+4=8; Grade Group 4); in  1 of 1   cores, measuring  20mm in greatest linear extent and  100% of core needle   tissue; perineural invasion is present    Comment: Interp By Alicia Case M.D., Signed on 03/11/2022 at 09:18     TRUS +SV involvement  52g      Imaging  -  Assessment and Plan:   Prostate cancer  Trace 8 [id number]; psa 88.2    Ax[facility] PET; Dr Flores will order this  Given his age, prostatectomy would not be appropriate  See heme onc and rad onc today    Stage 3 chronic kidney disease, unspecified whether stage 3a or 3b CKD    Renal lesion; cyst  No intervention needed    Enlarged prostate  Cont flomax and proscar  See NP 3 months for UA and PVR

## 2022-03-22 ENCOUNTER — OFFICE VISIT (OUTPATIENT)
Dept: RADIATION ONCOLOGY | Facility: CLINIC | Age: 87
End: 2022-03-22
Payer: MEDICARE

## 2022-03-22 ENCOUNTER — OFFICE VISIT (OUTPATIENT)
Dept: HEMATOLOGY/ONCOLOGY | Facility: CLINIC | Age: 87
End: 2022-03-22
Payer: MEDICARE

## 2022-03-22 ENCOUNTER — TELEPHONE (OUTPATIENT)
Dept: UROLOGY | Facility: CLINIC | Age: 87
End: 2022-03-22
Payer: MEDICARE

## 2022-03-22 ENCOUNTER — OFFICE VISIT (OUTPATIENT)
Dept: UROLOGY | Facility: CLINIC | Age: 87
End: 2022-03-22
Payer: MEDICARE

## 2022-03-22 VITALS
DIASTOLIC BLOOD PRESSURE: 65 MMHG | HEART RATE: 62 BPM | WEIGHT: 184.06 LBS | HEART RATE: 62 BPM | TEMPERATURE: 98 F | RESPIRATION RATE: 16 BRPM | HEIGHT: 68 IN | SYSTOLIC BLOOD PRESSURE: 143 MMHG | TEMPERATURE: 98 F | SYSTOLIC BLOOD PRESSURE: 143 MMHG | HEART RATE: 62 BPM | HEIGHT: 68 IN | RESPIRATION RATE: 18 BRPM | DIASTOLIC BLOOD PRESSURE: 65 MMHG | WEIGHT: 184.06 LBS | SYSTOLIC BLOOD PRESSURE: 143 MMHG | BODY MASS INDEX: 27.9 KG/M2 | DIASTOLIC BLOOD PRESSURE: 65 MMHG | OXYGEN SATURATION: 96 % | RESPIRATION RATE: 16 BRPM | BODY MASS INDEX: 27.9 KG/M2 | WEIGHT: 184 LBS | OXYGEN SATURATION: 96 % | HEIGHT: 68 IN | BODY MASS INDEX: 27.89 KG/M2

## 2022-03-22 DIAGNOSIS — C61 PROSTATE CANCER: Primary | ICD-10-CM

## 2022-03-22 DIAGNOSIS — C61 PROSTATE CANCER: ICD-10-CM

## 2022-03-22 DIAGNOSIS — N18.30 STAGE 3 CHRONIC KIDNEY DISEASE, UNSPECIFIED WHETHER STAGE 3A OR 3B CKD: ICD-10-CM

## 2022-03-22 DIAGNOSIS — R97.20 ELEVATED PSA: ICD-10-CM

## 2022-03-22 DIAGNOSIS — N40.0 ENLARGED PROSTATE: ICD-10-CM

## 2022-03-22 DIAGNOSIS — N28.9 RENAL LESION: ICD-10-CM

## 2022-03-22 PROCEDURE — 1126F PR PAIN SEVERITY QUANTIFIED, NO PAIN PRESENT: ICD-10-PCS | Mod: CPTII,S$GLB,, | Performed by: UROLOGY

## 2022-03-22 PROCEDURE — 99999 PR PBB SHADOW E&M-EST. PATIENT-LVL IV: ICD-10-PCS | Mod: PBBFAC,,, | Performed by: INTERNAL MEDICINE

## 2022-03-22 PROCEDURE — 99999 PR PBB SHADOW E&M-EST. PATIENT-LVL III: CPT | Mod: PBBFAC,,, | Performed by: STUDENT IN AN ORGANIZED HEALTH CARE EDUCATION/TRAINING PROGRAM

## 2022-03-22 PROCEDURE — 99202 OFFICE O/P NEW SF 15 MIN: CPT | Mod: S$GLB,,, | Performed by: INTERNAL MEDICINE

## 2022-03-22 PROCEDURE — 1101F PT FALLS ASSESS-DOCD LE1/YR: CPT | Mod: CPTII,S$GLB,, | Performed by: UROLOGY

## 2022-03-22 PROCEDURE — 99205 OFFICE O/P NEW HI 60 MIN: CPT | Mod: S$GLB,,, | Performed by: STUDENT IN AN ORGANIZED HEALTH CARE EDUCATION/TRAINING PROGRAM

## 2022-03-22 PROCEDURE — 1126F PR PAIN SEVERITY QUANTIFIED, NO PAIN PRESENT: ICD-10-PCS | Mod: CPTII,S$GLB,, | Performed by: STUDENT IN AN ORGANIZED HEALTH CARE EDUCATION/TRAINING PROGRAM

## 2022-03-22 PROCEDURE — 1126F AMNT PAIN NOTED NONE PRSNT: CPT | Mod: CPTII,S$GLB,, | Performed by: UROLOGY

## 2022-03-22 PROCEDURE — 1160F PR REVIEW ALL MEDS BY PRESCRIBER/CLIN PHARMACIST DOCUMENTED: ICD-10-PCS | Mod: CPTII,S$GLB,, | Performed by: INTERNAL MEDICINE

## 2022-03-22 PROCEDURE — 1101F PT FALLS ASSESS-DOCD LE1/YR: CPT | Mod: CPTII,S$GLB,, | Performed by: STUDENT IN AN ORGANIZED HEALTH CARE EDUCATION/TRAINING PROGRAM

## 2022-03-22 PROCEDURE — 99999 PR PBB SHADOW E&M-EST. PATIENT-LVL IV: CPT | Mod: PBBFAC,,, | Performed by: INTERNAL MEDICINE

## 2022-03-22 PROCEDURE — 3288F FALL RISK ASSESSMENT DOCD: CPT | Mod: CPTII,S$GLB,, | Performed by: STUDENT IN AN ORGANIZED HEALTH CARE EDUCATION/TRAINING PROGRAM

## 2022-03-22 PROCEDURE — 1101F PT FALLS ASSESS-DOCD LE1/YR: CPT | Mod: CPTII,S$GLB,, | Performed by: INTERNAL MEDICINE

## 2022-03-22 PROCEDURE — 1126F PR PAIN SEVERITY QUANTIFIED, NO PAIN PRESENT: ICD-10-PCS | Mod: CPTII,S$GLB,, | Performed by: INTERNAL MEDICINE

## 2022-03-22 PROCEDURE — 1101F PR PT FALLS ASSESS DOC 0-1 FALLS W/OUT INJ PAST YR: ICD-10-PCS | Mod: CPTII,S$GLB,, | Performed by: UROLOGY

## 2022-03-22 PROCEDURE — 1101F PR PT FALLS ASSESS DOC 0-1 FALLS W/OUT INJ PAST YR: ICD-10-PCS | Mod: CPTII,S$GLB,, | Performed by: INTERNAL MEDICINE

## 2022-03-22 PROCEDURE — 99214 PR OFFICE/OUTPT VISIT, EST, LEVL IV, 30-39 MIN: ICD-10-PCS | Mod: S$GLB,,, | Performed by: UROLOGY

## 2022-03-22 PROCEDURE — 1160F RVW MEDS BY RX/DR IN RCRD: CPT | Mod: CPTII,S$GLB,, | Performed by: INTERNAL MEDICINE

## 2022-03-22 PROCEDURE — 99999 PR PBB SHADOW E&M-EST. PATIENT-LVL IV: ICD-10-PCS | Mod: PBBFAC,,, | Performed by: UROLOGY

## 2022-03-22 PROCEDURE — 1126F AMNT PAIN NOTED NONE PRSNT: CPT | Mod: CPTII,S$GLB,, | Performed by: STUDENT IN AN ORGANIZED HEALTH CARE EDUCATION/TRAINING PROGRAM

## 2022-03-22 PROCEDURE — 3288F PR FALLS RISK ASSESSMENT DOCUMENTED: ICD-10-PCS | Mod: CPTII,S$GLB,, | Performed by: INTERNAL MEDICINE

## 2022-03-22 PROCEDURE — 1159F MED LIST DOCD IN RCRD: CPT | Mod: CPTII,S$GLB,, | Performed by: INTERNAL MEDICINE

## 2022-03-22 PROCEDURE — 99202 PR OFFICE/OUTPT VISIT, NEW, LEVL II, 15-29 MIN: ICD-10-PCS | Mod: S$GLB,,, | Performed by: INTERNAL MEDICINE

## 2022-03-22 PROCEDURE — 3288F PR FALLS RISK ASSESSMENT DOCUMENTED: ICD-10-PCS | Mod: CPTII,S$GLB,, | Performed by: STUDENT IN AN ORGANIZED HEALTH CARE EDUCATION/TRAINING PROGRAM

## 2022-03-22 PROCEDURE — 1126F AMNT PAIN NOTED NONE PRSNT: CPT | Mod: CPTII,S$GLB,, | Performed by: INTERNAL MEDICINE

## 2022-03-22 PROCEDURE — 1101F PR PT FALLS ASSESS DOC 0-1 FALLS W/OUT INJ PAST YR: ICD-10-PCS | Mod: CPTII,S$GLB,, | Performed by: STUDENT IN AN ORGANIZED HEALTH CARE EDUCATION/TRAINING PROGRAM

## 2022-03-22 PROCEDURE — 99214 OFFICE O/P EST MOD 30 MIN: CPT | Mod: S$GLB,,, | Performed by: UROLOGY

## 2022-03-22 PROCEDURE — 99999 PR PBB SHADOW E&M-EST. PATIENT-LVL IV: CPT | Mod: PBBFAC,,, | Performed by: UROLOGY

## 2022-03-22 PROCEDURE — 1159F PR MEDICATION LIST DOCUMENTED IN MEDICAL RECORD: ICD-10-PCS | Mod: CPTII,S$GLB,, | Performed by: INTERNAL MEDICINE

## 2022-03-22 PROCEDURE — 3288F FALL RISK ASSESSMENT DOCD: CPT | Mod: CPTII,S$GLB,, | Performed by: INTERNAL MEDICINE

## 2022-03-22 PROCEDURE — 3288F PR FALLS RISK ASSESSMENT DOCUMENTED: ICD-10-PCS | Mod: CPTII,S$GLB,, | Performed by: UROLOGY

## 2022-03-22 PROCEDURE — 3288F FALL RISK ASSESSMENT DOCD: CPT | Mod: CPTII,S$GLB,, | Performed by: UROLOGY

## 2022-03-22 PROCEDURE — 99999 PR PBB SHADOW E&M-EST. PATIENT-LVL III: ICD-10-PCS | Mod: PBBFAC,,, | Performed by: STUDENT IN AN ORGANIZED HEALTH CARE EDUCATION/TRAINING PROGRAM

## 2022-03-22 PROCEDURE — 99205 PR OFFICE/OUTPT VISIT, NEW, LEVL V, 60-74 MIN: ICD-10-PCS | Mod: S$GLB,,, | Performed by: STUDENT IN AN ORGANIZED HEALTH CARE EDUCATION/TRAINING PROGRAM

## 2022-03-22 NOTE — TELEPHONE ENCOUNTER
Nurse navigator left voicemail with detailed contact information to return call. Would like to offer patient earlier appointment due to inclement weather this afternoon.

## 2022-03-22 NOTE — PROGRESS NOTES
Radiation Oncology Consult Note        Date of Service: 03/22/2022    Chief Complaint: prostate cancer     Reason for visit: consideration for radiation to the pelvis     Referring Physician: Dr Duran (urology)     Therapy to Date:  No radiation      Diagnosis/Assessment:   Thai Rincon is a 89 y.o. man with high risk prostate adenocarcinoma   pending staging full staging (cT2c, PSA: 88.2, Grade Group: 4) s/p TRUS prostate biopsy (Dr Duran, 3/7/2022) with 52gm gland, 6/6 cores involved with prostatic adenocarcinoma ([place] score 4+4=8; Grade Group 4), 2/6 cores with GG4. KENDRA prostate per Dr Duran nodular and very hard, with PSA 88.2 (on finasteride).    PMH BPH, HTN, CKD3    MSK nomogram risk EPE, SVI, LNI (%,%,%): 99, 72, 89    ECOG 1, doing very well for his age, since he does all his ADL, cleans, takes the trash out, etc..    Plan   We discussed treatment options per NCCN guidelines v2022 for patients who are high risk N0M0:    For high risk N0M0, LE>5 yr or symptomatic   - EBRT + long-term ADT (1.5 - 3 years)  - RP +/- PLND    For high risk N0M0, LE<5 yr and asymptomatic  - EBRT   - ADT  - observation    EBRT would consist of daily radiation treatments M-F, 70Gy in 28 fractions to the prostate gland +/- regional pelvic lymph nodes.      Risks, benefits, and side effects of radiotherapy were discussed.   Side effects include but are not limited to fatigue, erythema, hyperpigmentation, desquamation within the radiation field, more frequent urination, both during the day and at night, urgency with urination, hematuria, dysuria, and a sensation of incomplete emptying.   In addition, the patient will be at risk for increased frequency and/or loose bowel movements.   All of these side effects will go away in the short term.   In the long term, the patient is at risk for radiation cystitis, radiation proctitis, and erectile dysfunction.     - the patient needs to be fully staged: if M0, we can consider  radiation similar to above after further discussion since he is 89. He has great ECOG for his age.  - if M1, we will determine if he has high or low burden metastatic disease. If low burden, he can get prostate directed RT, after, of course, further discussion since he is 89    - recommend PET axumin  - no RT consent signed today  - Epic- 26 survey not filled  - I will call the patient 4/5/2022 to follow-up on his scans    HPI:   Thai Rincon is a 89 y.o. man with recent diagnosis of prostate cancer after presenting with elevated PSA. He was initially referred to Dr Dr Duran for a renal mass    Oncologic history:  01/28/2022 visit with urology (Dr Duran)   KENDRA prostate is nodular and very hard    1/28/2022 PSA 88.2 (on finasteride)    2/3/2022 US abdomen   Bilateral kidneys demonstrate cortical thinning   Benign right kidney cyst.    3/7/2022 TRUS prostate biopsy (Dr Duran)   52gm   6/6 cores involved with prostatic adenocarcinoma ([place] score 4+4=8; Grade Group 4);     2/6 cores with Trace score 4+4=8; Grade Group 4    Subjective:   In clinic the patient is accompanied by his girlfriend Luma    The patient reports feeling well overall.  The patient denies major urinary or bowel or sexual function issues. Has daily BMs in the morning.  The patient denies other major complaints such as bone pain.  He takes finasteride and flomax.  He does all his ADL, cleans, takes the trash out, etc..    AUA score 14 (2,3,1,1,2,1,4) mostly satisfied  EDUARD score 10 (2,2,1,1,4) moderate ED     The patient denies any history of radiation therapy, implantable cardiac devices, or connective tissue disease.    Social history  Only child. Lives in [place] with his girlfriend Luma. He has 9 children ages from 50 to 65. No pets. Never smoker and does not drink any alcohol. Retired from chemical plant.    Family history  denies FH of prostate cancer and any other types of cancers    Current Outpatient Medications on File Prior  to Visit   Medication Sig Dispense Refill    acetaminophen (TYLENOL) 500 MG tablet Take 500 mg by mouth every 6 (six) hours as needed for Pain.      albuterol (PROVENTIL/VENTOLIN HFA) 90 mcg/actuation inhaler Inhale 2 puffs into the lungs every 6 (six) hours as needed for Wheezing. Rescue      albuterol-ipratropium  mcg (COMBIVENT)  mcg/actuation inhaler Inhale 2 puffs into the lungs every 6 (six) hours as needed for Wheezing.      aspirin 81 MG Chew Take 81 mg by mouth once daily.      atorvastatin (LIPITOR) 10 MG tablet Take 10 mg by mouth once daily.      finasteride (PROSCAR) 5 mg tablet Take 5 mg by mouth once daily.      fluticasone (FLONASE) 50 mcg/actuation nasal spray 1 spray by Each Nare route once daily.      furosemide (LASIX) 20 MG tablet Take 20 mg by mouth once daily.      loratadine (CLARITIN) 10 mg tablet Take 10 mg by mouth daily as needed for Allergies.      losartan (COZAAR) 50 MG tablet Take 50 mg by mouth once daily.      metoprolol succinate (TOPROL-XL) 25 MG 24 hr tablet Take 25 mg by mouth once daily.      tamsulosin (FLOMAX) 0.4 mg Cap Take 0.4 mg by mouth.       No current facility-administered medications on file prior to visit.     Review of patient's allergies indicates:  No Known Allergies    Past Surgical History:   Procedure Laterality Date    HERNIA REPAIR Right 2010     Past Medical History:   Diagnosis Date    BPH (benign prostatic hyperplasia)     Elevated PSA     Glaucoma     Hypertension     Kidney stones        Review of Systems   Constitutional: Positive for fatigue. Negative for fever and unexpected weight change.   HENT: Negative for hearing loss and sinus pressure/congestion.    Eyes: Positive for visual disturbance (blurry vision). Negative for photophobia.   Respiratory: Negative for cough, shortness of breath and wheezing.    Cardiovascular: Positive for leg swelling. Negative for chest pain and palpitations.   Gastrointestinal: Positive  for abdominal pain. Negative for blood in stool, constipation, diarrhea, nausea, vomiting and reflux.   Genitourinary: Positive for frequency. Negative for bladder incontinence, difficulty urinating, dysuria, hematuria and urgency.   Musculoskeletal: Negative for arthralgias, back pain and neck pain.        Frequent cramps in right hand fingers   Integumentary:  Negative for rash.   Neurological: Positive for memory loss. Negative for dizziness, seizures, speech difficulty, weakness, numbness and headaches.   Psychiatric/Behavioral: Negative for dysphoric mood. The patient is not nervous/anxious.          Objective:      Physical Exam  Vitals reviewed.   Constitutional:       Appearance: Normal appearance.   HENT:      Head: Normocephalic and atraumatic.      Mouth/Throat:      Mouth: Mucous membranes are moist.   Eyes:      Conjunctiva/sclera: Conjunctivae normal.   Cardiovascular:      Comments: extremities well perfused  Pulmonary:      Effort: Pulmonary effort is normal.   Abdominal:      General: There is no distension.   Genitourinary:     Comments: KENDRA deferred, cT2 per Dr Duran  Musculoskeletal:         General: Normal range of motion.      Cervical back: Normal range of motion.   Skin:     General: Skin is warm and dry.   Neurological:      General: No focal deficit present.      Mental Status: He is alert and oriented to person, place, and time.   Psychiatric:         Mood and Affect: Mood normal.         Behavior: Behavior normal.         Imaging: I have personally reviewed the patient's available images and reports and summarized pertinent findings above in HPI.     Pathology: I have personally reviewed the patient's available pathology and summarized pertinent findings above in HPI.      Laboratory: I have personally reviewed the patient's available laboratory values and summarized pertinent findings above in HPI.      I spent approximately 60 minutes reviewing the available records and evaluating the  patient, out of which over 50% of the time was spent face to face with the patient in counseling and coordinating this patient's care.     Thank you for the opportunity to care for this patient. Please do not hesitate to contact me with any questions.     Andrey Mcgovern MD/PhD

## 2022-04-14 ENCOUNTER — HOSPITAL ENCOUNTER (OUTPATIENT)
Dept: RADIOLOGY | Facility: HOSPITAL | Age: 87
Discharge: HOME | End: 2022-04-14
Attending: INTERNAL MEDICINE
Payer: MEDICARE

## 2022-04-14 DIAGNOSIS — C61 PROSTATE CANCER: ICD-10-CM

## 2022-04-14 PROCEDURE — 78815 PET IMAGE W/CT SKULL-THIGH: CPT | Mod: PI,TC

## 2022-04-14 PROCEDURE — 78815 PET IMAGE W/CT SKULL-THIGH: CPT | Mod: 26,PS,, | Performed by: RADIOLOGY

## 2022-04-14 PROCEDURE — 78815 NM PET CT FLUCICLOVINE F18(PROSTATE CANCER RECURRENCE): ICD-10-PCS | Mod: 26,PS,, | Performed by: RADIOLOGY

## 2022-04-19 ENCOUNTER — TELEPHONE (OUTPATIENT)
Dept: HEMATOLOGY/ONCOLOGY | Facility: CLINIC | Age: 87
End: 2022-04-19
Payer: MEDICARE

## 2022-04-19 NOTE — TELEPHONE ENCOUNTER
Spoke with patient who stated that he didn't realize he had an appointment today and will need to reschedule.

## 2022-06-23 ENCOUNTER — TELEPHONE (OUTPATIENT)
Dept: RADIATION ONCOLOGY | Facility: CLINIC | Age: 87
End: 2022-06-23
Payer: MEDICARE

## 2022-06-23 DIAGNOSIS — C61 PROSTATE CANCER: Primary | ICD-10-CM

## 2022-06-23 NOTE — TELEPHONE ENCOUNTER
Pt returning my call. Phone review to reschedule treatment planning, pt states he does not want any treatment because he doesn't have any money. Dr. Mcgovern has been notified.

## 2022-06-23 NOTE — PROGRESS NOTES
consult to help patient who is declining therapy due to financial toxicity      Thanks  Andrey Mcgovern MD/PhD  Radiation Oncology

## 2022-06-27 ENCOUNTER — DOCUMENTATION ONLY (OUTPATIENT)
Dept: HEMATOLOGY/ONCOLOGY | Facility: CLINIC | Age: 87
End: 2022-06-27
Payer: MEDICARE

## 2022-06-27 NOTE — PROGRESS NOTES
"SW received a consult from Dr Mcgovern via [employer].     Patient is declining care due to financial instability. SW spoke to patient via phone, [phone]. Patient had difficulty explaining the specific concern he had with treatment and finances. Patient explained "By the time I'm finished with all that I can't pay the bills." Then stated he wanted to discuss this with his kids. SW provided his number and request kids call with any questions or concerns as well.     SARAY will continue to follow.    "

## 2022-07-07 ENCOUNTER — DOCUMENTATION ONLY (OUTPATIENT)
Dept: HEMATOLOGY/ONCOLOGY | Facility: CLINIC | Age: 87
End: 2022-07-07
Payer: MEDICARE

## 2022-07-07 NOTE — PROGRESS NOTES
SW called patient's primary number, [phone], which is actually number of friend, Luma. She explained that patient will be home at 4p. SW will call at that time.    SW spoke to patient and Josécelia. They explained that patient does not want treatment. While cost is a factor, patient is feeling well and does not want put his body through treatment.     SW notified Dr Mcgovern, asked advice on calling patient's family tomorrow to discuss hospice.